# Patient Record
Sex: MALE | Race: WHITE | NOT HISPANIC OR LATINO | Employment: FULL TIME | URBAN - METROPOLITAN AREA
[De-identification: names, ages, dates, MRNs, and addresses within clinical notes are randomized per-mention and may not be internally consistent; named-entity substitution may affect disease eponyms.]

---

## 2023-04-14 LAB
PROSTATE SPECIFIC AG (NG/ML) IN SER/PLAS: 9 NG/ML (ref 0–4)
PROSTATE SPECIFIC AG FREE (NG/ML) IN SER/PLAS: 1.5 NG/ML
PROSTATE SPECIFIC AG FREE/PROSTATE SPECIFIC AG TOTAL IN SER/PLAS: 17 %

## 2024-03-27 ENCOUNTER — HOSPITAL ENCOUNTER (OUTPATIENT)
Dept: RADIATION ONCOLOGY | Facility: CLINIC | Age: 69
Setting detail: RADIATION/ONCOLOGY SERIES
Discharge: HOME | End: 2024-03-27
Payer: COMMERCIAL

## 2024-03-27 VITALS
RESPIRATION RATE: 18 BRPM | SYSTOLIC BLOOD PRESSURE: 155 MMHG | OXYGEN SATURATION: 96 % | TEMPERATURE: 97.5 F | HEART RATE: 68 BPM | HEIGHT: 70 IN | BODY MASS INDEX: 29.32 KG/M2 | WEIGHT: 204.81 LBS | DIASTOLIC BLOOD PRESSURE: 76 MMHG

## 2024-03-27 DIAGNOSIS — R33.9 INCOMPLETE BLADDER EMPTYING: Primary | ICD-10-CM

## 2024-03-27 DIAGNOSIS — C61 PROSTATE CANCER (MULTI): ICD-10-CM

## 2024-03-27 PROCEDURE — 99205 OFFICE O/P NEW HI 60 MIN: CPT | Performed by: RADIOLOGY

## 2024-03-27 PROCEDURE — 99215 OFFICE O/P EST HI 40 MIN: CPT | Performed by: RADIOLOGY

## 2024-03-27 RX ORDER — ASPIRIN 81 MG/1
TABLET ORAL
COMMUNITY

## 2024-03-27 RX ORDER — ATORVASTATIN CALCIUM 10 MG/1
10 TABLET, FILM COATED ORAL DAILY
COMMUNITY
Start: 2024-02-17

## 2024-03-27 RX ORDER — TADALAFIL 5 MG/1
5 TABLET ORAL DAILY
Qty: 30 TABLET | Refills: 11 | Status: SHIPPED | OUTPATIENT
Start: 2024-03-27 | End: 2025-03-22

## 2024-03-27 RX ORDER — TAMSULOSIN HYDROCHLORIDE 0.4 MG/1
0.4 CAPSULE ORAL DAILY
Qty: 30 CAPSULE | Refills: 11 | Status: SHIPPED | OUTPATIENT
Start: 2024-03-27 | End: 2025-03-22

## 2024-03-27 RX ORDER — LISINOPRIL 5 MG/1
5 TABLET ORAL
COMMUNITY
Start: 2014-06-30

## 2024-03-27 RX ORDER — CHLORHEXIDINE GLUCONATE 40 MG/ML
SOLUTION TOPICAL
COMMUNITY
Start: 2020-10-14

## 2024-03-27 RX ORDER — CHLORDIAZEPOXIDE HYDROCHLORIDE AND CLIDINIUM BROMIDE 5; 2.5 MG/1; MG/1
CAPSULE ORAL
COMMUNITY

## 2024-03-27 RX ORDER — BISOPROLOL FUMARATE 5 MG/1
TABLET, FILM COATED ORAL
COMMUNITY

## 2024-03-27 ASSESSMENT — PATIENT HEALTH QUESTIONNAIRE - PHQ9
SUM OF ALL RESPONSES TO PHQ9 QUESTIONS 1 AND 2: 0
2. FEELING DOWN, DEPRESSED OR HOPELESS: NOT AT ALL
1. LITTLE INTEREST OR PLEASURE IN DOING THINGS: NOT AT ALL

## 2024-03-27 ASSESSMENT — PAIN SCALES - GENERAL: PAINLEVEL: 0-NO PAIN

## 2024-03-27 ASSESSMENT — COLUMBIA-SUICIDE SEVERITY RATING SCALE - C-SSRS
1. IN THE PAST MONTH, HAVE YOU WISHED YOU WERE DEAD OR WISHED YOU COULD GO TO SLEEP AND NOT WAKE UP?: NO
2. HAVE YOU ACTUALLY HAD ANY THOUGHTS OF KILLING YOURSELF?: NO
6. HAVE YOU EVER DONE ANYTHING, STARTED TO DO ANYTHING, OR PREPARED TO DO ANYTHING TO END YOUR LIFE?: NO

## 2024-03-27 ASSESSMENT — ENCOUNTER SYMPTOMS
LOSS OF SENSATION IN FEET: 0
OCCASIONAL FEELINGS OF UNSTEADINESS: 0
DEPRESSION: 0

## 2024-03-27 NOTE — PROGRESS NOTES
Radiation Oncology Outpatient Consult    Patient Name:  Mauri Sanchez  MRN:  [id number]  :  1955    Referring Provider: Elliott Sanchez, *  Primary Care Provider: Oswaldo Allen MD  Care Team: Patient Care Team:  Oswaldo Allen MD as PCP - General    Date of Service: 3/27/2024       SUMMARY: 68 y.o. male with unfavorable-intermediate risk prostate cancer, [id number], Moffett score 3+4=7, 7/14 positive cores, pPSA 15, Grade group 2    SUBJECTIVE  History of Present Illness:  Mauri Sanchez is a 68 y.o. male who was referred by Elliott Sanchez, *, for a consultation to the [facility] Department of Radiation Oncology.  He is presenting for evaluation and management of prostate cancer    He was under active surveillance for low risk prostate cancer by urology ([facility] Dr Beckett). Pt reportedly underwent a biopsy  which demonstrated Moffett score 3+4=7, 7/14 positive cores  MRI of the prostate showed BPH, No focal lesions with imaging features suspicious for clinically significant prostate cancer (PI-RADS 1)  ZOFIA. PSMA PET/CT pending    Today, the patient reports feeling well overall. He denies changes in his symptoms.  His sexual Health Inventory for Men score (NAVARRO) is 15. His International Prostate Symptom Score (IPSS) score is 33. He is not on flomax or Cialis He denies diarrhea or constipation. He denies bone pain.    Prior Radiotherapy:  No  Radiation Treatments       No radiation treatments to show. (Treatments may have been administered in another system.)          Current Systemic Treatment:  No     Presence of Pacemaker or ICD:  No    Past Medical History:    Past Medical History:   Diagnosis Date    Basal cell carcinoma     GERD (gastroesophageal reflux disease)     Hypercholesteremia     Myocardial infarction (CMS/HCC)     Prostate cancer (CMS/HCC)     Squamous cell carcinoma of hand, left      History of autoimmune disease: No  History of Cancer: Yes, describe:  prostate CA initially diagnosis 2017 and Skin CA    Past Surgical History:    Past Surgical History:   Procedure Laterality Date    CARDIAC SURGERY      SKIN CANCER EXCISION          Family History:  Cancer-related family history includes Skin cancer in his father and mother.    Social History:    Social History     Tobacco Use    Smoking status: Every Day     Types: Cigarettes     Passive exposure: Never    Smokeless tobacco: Never   Vaping Use    Vaping Use: Never used   Substance Use Topics    Alcohol use: Yes     Alcohol/week: 5.0 standard drinks of alcohol     Types: 5 Glasses of wine per week     Comment: occassionally    Drug use: Never     Where does the patient live: [facility]  Can they come to [place] for treatment (Yes/No):  Yes  Can they go to [facility] for treatment if indicated (Yes/No): Yes  If no [place]/[facility], then where would want to be treated:-   Smoking history (Current/Former/Never): Current without an interest in quitting  Occupation: Manager    Social work Assessment:  Does the patient require social work referral (Yes/No): No    Allergies:    Allergies   Allergen Reactions    Codeine Other    Sulfa (Sulfonamide Antibiotics) Hives        Medications:    Current Outpatient Medications:     atorvastatin (Lipitor) 10 mg tablet, Take 1 tablet (10 mg) by mouth once daily., Disp: , Rfl:     chlorhexidine (Hibiclens) 4 % external liquid, 2 Application, Disp: , Rfl:     lisinopril 5 mg tablet, Take 1 tablet (5 mg) by mouth., Disp: , Rfl:     aspirin 81 mg EC tablet, Take by mouth., Disp: , Rfl:     bisoprolol (Zebeta) 5 mg tablet, TAKE ONE TABLET BY MOUTH EVERY DAY FOR 90 DAYS, Disp: , Rfl:     chlordiazepoxide-clidinium (Librax) 5-2.5 mg capsule, TAKE ONE CAPSULE BY MOUTH DAILY FOR 90 DAYS, Disp: , Rfl:       Review of Systems:  Review of Systems   Constitutional:  Positive for diaphoresis and fatigue. Negative for appetite change, chills, fever and unexpected weight change.   HENT:  Negative.     Eyes:  "Negative.    Respiratory: Negative.     Cardiovascular: Negative.    Gastrointestinal: Negative.    Endocrine: Negative.    Genitourinary:  Positive for bladder incontinence, difficulty urinating, frequency, nocturia and pelvic pain. Negative for dyspareunia, dysuria and hematuria.         IPSS score 33 and NAVARRO score 15   Musculoskeletal: Negative.    Skin:  Positive for wound. Negative for itching and rash.        Squamous cell skin removal from back this AM   Neurological: Negative.    Hematological:  Negative for adenopathy. Bruises/bleeds easily.   Psychiatric/Behavioral: Negative.       The patient's current pain level was assessed.  They report currently having a pain of 0 out of 10.  They feel their pain is under control without the use of pain medications.      Performance Status:  The Karnofsky performance scale today is 90, Able to carry on normal activity; minor signs or symptoms of disease (ECOG equivalent 0).            OBJECTIVE  Physical Exam:  /76 (BP Location: Right arm, Patient Position: Sitting, BP Cuff Size: Large adult)   Pulse 68   Temp 36.4 °C (97.5 °F) (Temporal)   Resp 18   Ht (S) 1.782 m (5' 10.16\")   Wt 92.9 kg (204 lb 12.9 oz)   SpO2 96%   BMI 29.26 kg/m²    Constitutional: Awake, alert and oriented. No acute distress. Appears stated age.  Eyes: Conjunctivae are clear without exudates or hemorrhage. Eyelids are normal in appearance without swelling or lesions.  ENMT: mucous membranes moist, no apparent injury, no lesions seen  Neck: Neck supple, no apparent injury, trachea midline  Resp/Thorax: Patent airways,  good chest expansion. Thorax symmetric  Cardiovascular: The external chest is normal without lifts, heaves, or thrills. PMI is not visible.  GI: Nondistended, soft, non-tender.  /Gyn: Deferred  MSK: No tenderness noted on palpation of the spine. No ROM limitations.  Extremities: normal extremities, no cyanosis, erythema or edema.  Neurological: alert and oriented " x3. Sensory and motor function appear intact. No gait abnormality observed.  Psych: Appropriate mood and behavior.  Skin: Warm and dry, no new lesions or rashes.      Laboratory Review:    PSA   Date Value Ref Range Status   04/11/2023 9.0 (H) 0.0 - 4.0 ng/mL Final     Comment:     INTERPRETIVE INFORMATION: Prostate Specific Antigen  The Roche PSA electrochemiluminescent immunoassay is used. Results   obtained with different test methods or kits cannot be used   interchangeably. The Roche PSA method is approved for use as an   aid in the detection of prostate cancer when used in conjunction   with a digital rectal exam in individuals with a prostate age 50   years and older. The Roche PSA is also indicated for the serial   measurement of PSA to aid in the prognosis and management of   prostate cancer patients. Elevated PSA concentrations can only   suggest the presence of prostate cancer until biopsy is performed.   PSA concentrations can also be elevated in benign prostatic   hyperplasia or inflammatory conditions of the prostate. PSA is   generally not elevated in healthy individuals or individuals with   nonprostatic carcinoma.       PSA, Free   Date Value Ref Range Status   04/11/2023 1.5 ng/mL Final     PSA, Free Pct   Date Value Ref Range Status   04/11/2023 17 % Final     Comment:     INTERPRETIVE INFORMATION: Prostate Specific Antigen, Free                            Percentage  [facility] uses the Roche Free PSA electrochemiluminescent immunoassay   method in conjunction with the Roche PSA electrochemiluminescent   immunoassay method to determine the free PSA percentage. Values   obtained with different assay methods should not be used   interchangeably. The free PSA percentage is an aid in   distinguishing prostate cancer from benign prostatic conditions in   individuals with a prostate age 50 years and older with a total   PSA between 3 and 10 ng/mL and negative digital rectal examination   findings. Prostatic  biopsy is required for the diagnosis of   cancer.   In patients with total PSA concentrations of 4-10 ng/mL, the   probability of finding prostate cancer on needle biopsy by age in   years is:  %fPSA               50-59    60-69    70 or older  0  - 10%            49%      58%      65%  11 - 18%            27%      34%      41%  19 - 25%            18%      24%      30%  Greater than 25%     9%      12%      16%  Other factors may help determine the actual risk of prostate   cancer in individual patients.  Performed By: [facility]  [address] [place] [id number]  : Duglas Nova MD, PhD         The pertinent lab results were reviewed and discussed with the patient.      Pathology Review:          Imaging:  The pertinent imaging results were reviewed and discussed with the patient.  See HPI       ASSESSMENT:  Mauri Sanchez is a 68 y.o. male with No matching staging information was found for the patient..      COUNSELING AND COORDINATION OF CARE:    The natural history of prostate cancer was reviewed with the patient. Prognostic factors including the following were discussed: clinical staging by JUJU, pretreatment PSA, GS/grade group on biopsy, and the number of biopsy cores involved with cancer, cancer volume, and the patient's performance status/comorbidities.    The patient's the clinical presentation, PSA lab findings and imaging findings were presented. Based on his risk factors, he would be classified as unfavorable intermediate risk.    We discussed with the patient treatment options including radical prostatectomy, radiation therapy including stereotactic body radiation therapy, standard and hypofractionated radiation therapy with or without short term androgen deprivation therapy using photons, protons and brachytherapy. Potential benefit, side effects and complications of each treatment was discussed.    The indications, benefits, side effects and complications of  radiotherapy, which include both acute and late side effects were highlighted. Acute: Fatigue, dysuria, frequency, urine retention, rectal urgency, diarrhea. Late: Stricture, cystitis, proctitis, sexual dysfunction, second malignancy have all been discussed in detail with the patient. All questions were answered to the patient´s satisfaction.    We discussed the importance of fiducials and SpaceOAR for SBRT and proton radiation therapy.  We also discussed the benefit of SpaceOAR for all patients undergoing radiation therapy to help spare rectal toxicity.  We briefly discussed the process of prostate seed implant.     The patient had all questions answered and concerns addressed during the visit. The patient appears to be leaning towards radiotherapy at this time and would like to initiate preperation for treatment.    The patient appears  to  be  eligible for G -010 for U[facility] PCA patients, where the patient would be assigned to a subgroup based on Decipher score, with randomization to de-escalation vs SOC for low decipher, or escalation of therapy vs SOC for high decipher        PLAN:    Will obtain repeat labs   Will work on obtaining outside records   Staging PSMA PET/CT given UIR  Will follow-up after PET/CT    NCCN Guidelines were applicable to guide this patients treatment plan.   Meredith Gardner MD       Future Appointments   Date Time Provider Department Center   4/1/2024  2:45 PM PAR [facility] MOBILE ADMIN ROOM PET CT PARPETCTMOB [facility] PET   4/1/2024  3:45 PM PAR [facility] MOBILE PET CT [facility]PET[facility]B [facility] PET   4/15/2024  1:30 PM Meredith Gardner MD [id number]

## 2024-03-27 NOTE — ADDENDUM NOTE
Encounter addended by: Estiven Castillo RN on: 3/27/2024 5:17 PM   Actions taken: Patient Education assessment filed

## 2024-03-27 NOTE — ADDENDUM NOTE
Encounter addended by: Meredith Gardner MD on: 3/27/2024 4:29 PM   Actions taken: Clinical Note Signed

## 2024-03-27 NOTE — ADDENDUM NOTE
Encounter addended by: Meredith Gardner MD on: 3/27/2024 4:30 PM   Actions taken: Clinical Note Signed

## 2024-03-28 ENCOUNTER — LAB (OUTPATIENT)
Dept: LAB | Facility: LAB | Age: 69
End: 2024-03-28
Payer: COMMERCIAL

## 2024-03-28 DIAGNOSIS — C61 PROSTATE CANCER (MULTI): ICD-10-CM

## 2024-03-28 LAB — TESTOST SERPL-MCNC: 661 NG/DL (ref 240–1000)

## 2024-03-28 PROCEDURE — 36415 COLL VENOUS BLD VENIPUNCTURE: CPT

## 2024-03-28 PROCEDURE — 84154 ASSAY OF PSA FREE: CPT

## 2024-03-28 PROCEDURE — 84403 ASSAY OF TOTAL TESTOSTERONE: CPT

## 2024-03-28 PROCEDURE — 84153 ASSAY OF PSA TOTAL: CPT

## 2024-03-29 ENCOUNTER — LAB REQUISITION (OUTPATIENT)
Dept: LAB | Facility: HOSPITAL | Age: 69
End: 2024-03-29
Payer: COMMERCIAL

## 2024-03-29 PROCEDURE — 88321 CONSLTJ&REPRT SLD PREP ELSWR: CPT | Performed by: PATHOLOGY

## 2024-03-30 LAB
PSA FREE MFR SERPL: 13 %
PSA FREE SERPL-MCNC: 2.1 NG/ML
PSA SERPL IA-MCNC: 15.8 NG/ML (ref 0–4)

## 2024-04-01 ENCOUNTER — HOSPITAL ENCOUNTER (OUTPATIENT)
Dept: RADIOLOGY | Facility: CLINIC | Age: 69
Discharge: HOME | End: 2024-04-01
Payer: COMMERCIAL

## 2024-04-01 DIAGNOSIS — C61 PROSTATE CANCER (MULTI): ICD-10-CM

## 2024-04-01 PROCEDURE — 78815 PET IMAGE W/CT SKULL-THIGH: CPT | Mod: PI

## 2024-04-01 PROCEDURE — 78815 PET IMAGE W/CT SKULL-THIGH: CPT | Mod: PET TUMOR INIT TX STRAT | Performed by: STUDENT IN AN ORGANIZED HEALTH CARE EDUCATION/TRAINING PROGRAM

## 2024-04-15 ENCOUNTER — HOSPITAL ENCOUNTER (OUTPATIENT)
Dept: RADIATION ONCOLOGY | Facility: CLINIC | Age: 69
Setting detail: RADIATION/ONCOLOGY SERIES
Discharge: HOME | End: 2024-04-15
Payer: COMMERCIAL

## 2024-04-15 DIAGNOSIS — C61 PROSTATE CANCER (MULTI): Primary | ICD-10-CM

## 2024-04-15 PROCEDURE — 99214 OFFICE O/P EST MOD 30 MIN: CPT | Performed by: RADIOLOGY

## 2024-04-15 NOTE — PROGRESS NOTES
Radiation Oncology Follow-Up    Patient Name:  Mauri Sanchez  MRN:  [id number]  :  1955    Referring Provider: Elliott Sanchez, *  Primary Care Provider: Oswaldo Allen MD  Care Team: Patient Care Team:  Oswaldo Allen MD as PCP - General    Date of Service: 4/15/2024     SUMMARY: 68 y.o. male with unfavorable-intermediate risk prostate cancer, [id number], Raz score 3+4=7, 7/14 positive cores, pPSA 15.8, Grade group 2     SUBJECTIVE  History of Present Illness:   Mauri Sanchez is a 68 y.o. male who is presenting today for follow-up.       INTERIM HISTORY:    PSMA PET/CT without evidence of distant mets    Most recent PSA     Lab Results   Component Value Date    PSA 15.8 (H) 2024       SUBJECTIVE:      Today, the patient reports feeling well overall. He denies changes in his symptoms.  His sexual Health Inventory for Men score (NAVARRO) is 15. His International Prostate Symptom Score (IPSS) score is 33. He is not on flomax or Cialis He denies diarrhea or constipation. He denies bone pain.     Review of Systems:    A 14-point review of systems is negative except for what's listed in the history of present illness    Performance Status:  ECOG 1        OBJECTIVE  Vital Signs:  There were no vitals taken for this visit.   Physical Exam:  Constitutional: Awake, alert and oriented. No acute distress. Appears stated age.  Neurological: alert and oriented x3  Psych: Appropriate mood and behavior.        Lab Results   Component Value Date    PSA 15.8 (H) 2024    PSA 9.0 (H) 2023    TESTOSTERONE 661 2024            ASSESSMENT:  Mauri Sanchez is a 68 y.o. male with Prostate cancer (Multi), Clinical: Stage IIB (cT1c, cN0, cM0, PSA: 15.8, Grade Group: 2).      COUNSELING AND COORDINATION OF CARE:    The natural history of prostate cancer was reviewed with the patient. Prognostic factors including the following were discussed: clinical staging by JUJU, pretreatment PSA, GS/grade group on  biopsy, and the number of biopsy cores involved with cancer, cancer volume, and the patient's performance status/comorbidities.    The patient's the clinical presentation, PSA lab findings and imaging findings were presented. Based on his risk factors, he would be classified as unfavorable intermediate risk.    We discussed with the patient treatment options including radical prostatectomy, radiation therapy including stereotactic body radiation therapy, standard and hypofractionated radiation therapy with or without short term androgen deprivation therapy using photons, protons and brachytherapy. Potential benefit, side effects and complications of each treatment was discussed.    The indications, benefits, side effects and complications of radiotherapy, which include both acute and late side effects were highlighted. Acute: Fatigue, dysuria, frequency, urine retention, rectal urgency, diarrhea. Late: Stricture, cystitis, proctitis, sexual dysfunction, second malignancy have all been discussed in detail with the patient. All questions were answered to the patient´s satisfaction.    We discussed the importance of fiducials and SpaceOAR for SBRT and proton radiation therapy.  We also discussed the benefit of SpaceOAR for all patients undergoing radiation therapy to help spare rectal toxicity.  We briefly discussed the process of prostate seed implant.     The patient had all questions answered and concerns addressed during the visit. The patient appears to be leaning towards radiotherapy at this time and would like to initiate preperation for treatment.        PLAN:    The patient would like to proceed with SBRT  Will refer the patient to Shade Corrales for ADT injection and spaceOAR  Pt would like ADT injection on his next FU visit, but spaceOAR after the 4th of the July  MRI and CT sim after SpaceOAR insertion      NCCN Guidelines were applicable to guide this patients treatment plan.  Meredith Gardner MD       No future  appointments.

## 2024-05-13 ENCOUNTER — TELEPHONE (OUTPATIENT)
Dept: RADIATION ONCOLOGY | Facility: CLINIC | Age: 69
End: 2024-05-13
Payer: COMMERCIAL

## 2024-05-22 LAB
LAB AP ASR DISCLAIMER: NORMAL
LABORATORY COMMENT REPORT: NORMAL
PATH REPORT.FINAL DX SPEC: NORMAL
PATH REPORT.GROSS SPEC: NORMAL
PATH REPORT.TOTAL CANCER: NORMAL

## 2024-05-23 ENCOUNTER — OFFICE VISIT (OUTPATIENT)
Dept: UROLOGY | Facility: CLINIC | Age: 69
End: 2024-05-23
Payer: COMMERCIAL

## 2024-05-23 VITALS
HEIGHT: 72 IN | DIASTOLIC BLOOD PRESSURE: 77 MMHG | WEIGHT: 206.13 LBS | BODY MASS INDEX: 27.92 KG/M2 | HEART RATE: 73 BPM | SYSTOLIC BLOOD PRESSURE: 134 MMHG | RESPIRATION RATE: 16 BRPM

## 2024-05-23 DIAGNOSIS — C61 PROSTATE CANCER (MULTI): Primary | ICD-10-CM

## 2024-05-23 DIAGNOSIS — N40.1 BPH WITH OBSTRUCTION/LOWER URINARY TRACT SYMPTOMS: ICD-10-CM

## 2024-05-23 DIAGNOSIS — N13.8 BPH WITH OBSTRUCTION/LOWER URINARY TRACT SYMPTOMS: ICD-10-CM

## 2024-05-23 PROCEDURE — 1159F MED LIST DOCD IN RCRD: CPT | Performed by: UROLOGY

## 2024-05-23 PROCEDURE — 99204 OFFICE O/P NEW MOD 45 MIN: CPT | Performed by: UROLOGY

## 2024-05-23 PROCEDURE — 1160F RVW MEDS BY RX/DR IN RCRD: CPT | Performed by: UROLOGY

## 2024-05-23 PROCEDURE — 1126F AMNT PAIN NOTED NONE PRSNT: CPT | Performed by: UROLOGY

## 2024-05-23 PROCEDURE — 99214 OFFICE O/P EST MOD 30 MIN: CPT | Performed by: UROLOGY

## 2024-05-23 PROCEDURE — G2211 COMPLEX E/M VISIT ADD ON: HCPCS | Performed by: UROLOGY

## 2024-05-23 PROCEDURE — 4004F PT TOBACCO SCREEN RCVD TLK: CPT | Performed by: UROLOGY

## 2024-05-23 ASSESSMENT — PAIN SCALES - GENERAL: PAINLEVEL: 0-NO PAIN

## 2024-05-23 NOTE — PROGRESS NOTES
History Of Present Illness  68-year-old male with prostate cancer referred to me for evaluation for ADT  PMH: Hyperlipidemia, prior MI, lower urinary tract symptoms, hypertension    Lower urinary tract symptoms on Flomax, tadalafil 5 mg daily.  Patient recently started, has improving symptoms.    Prostate cancer history:  - Intermediate risk unfavorable (50% cores, PSA 15.8, grade group 2)  - cT1c, grade group 2, 7/14 cores, PSA 15.8  Deana 15  IPSS 33  - 4/1/24 PSMA PET ROSANGELA outside of prostate    Past Medical History  He has a past medical history of Basal cell carcinoma, GERD (gastroesophageal reflux disease), Hypercholesteremia, Myocardial infarction (Multi), Prostate cancer (Multi), and Squamous cell carcinoma of hand, left.    Surgical History  He has a past surgical history that includes Skin cancer excision and Cardiac surgery.     Social History  He reports that he has been smoking cigarettes. He has never been exposed to tobacco smoke. He has never used smokeless tobacco. He reports current alcohol use of about 5.0 standard drinks of alcohol per week. He reports that he does not use drugs.    Family History  Family History   Problem Relation Name Age of Onset    Skin cancer Mother      Skin cancer Father          Allergies  Codeine and Sulfa (sulfonamide antibiotics)    ROS: 12 system review was completed and is negative with the exception of those signs and symptoms noted in the history of present illness: A 12 system review was completed and is negative with the exception of those signs and symptoms noted in the history of present illness.     Exam:  General: in NAD, appears stated age  Head: normocephalic, atraumatic  Respiratory: normal effort, no use of accessory muscles  Cardiovascular: no edema noted  Skin: normal turgor, no rashes  Neurologic: grossly intact, oriented to person/place/time  Psychiatric: mode and affect appropriate     Last Recorded Vitals  There were no vitals taken for this  "visit.    No results found for: \"PSASCREEN\", \"PSAR\", \"KSCOR\", \"CREATININE\", \"HGB\"      ASSESSMENT/PLAN:  # Prostate cancer, intermediate risk unfavorable patient has decided on radiation therapy  -We spent most of our visit today discussing hormone therapy, side effects including weakness, hot flashes, decrease in libido, breast growth  -We discussed that it typically takes 6 to 8 months before testosterone improves  -Plan for ADT roughly in July due to his travel needs  -We also discussed indications for ADT, specifically the improved progression free survival with short-term ADT combined with EBRT  -We also discussed SpaceOAR and fiducials.  I described the procedure to him in detail including perioperative risks, urinary retention, hematuria, injection into the rectum  -Patient will follow-up 6 months after ADT to check PSA, testosterone    # Lower urinary tract symptoms  -Continue tamsulosin, tadalafil    Shade Corrales MD    "

## 2024-06-20 DIAGNOSIS — C61 PROSTATE CANCER (MULTI): ICD-10-CM

## 2024-07-01 ENCOUNTER — APPOINTMENT (OUTPATIENT)
Dept: UROLOGY | Facility: CLINIC | Age: 69
End: 2024-07-01
Payer: COMMERCIAL

## 2024-07-08 ENCOUNTER — TELEPHONE (OUTPATIENT)
Dept: RADIATION ONCOLOGY | Facility: CLINIC | Age: 69
End: 2024-07-08
Payer: COMMERCIAL

## 2024-07-08 NOTE — TELEPHONE ENCOUNTER
2024    Called  Mauri  to discuss education for upcoming CT Simulation that is scheduled for  7/15/2024 @12:00 PM.  Verified patient name/.     Reviewed the following:     Bowel Preparation   The goal is to have 1 complete bowel movement each day.  If you do not have 1 complete bowel movement each day, try and get into this routine at least 2 days before your CT Simulation appointment.  Continue this routine throughout your treatment.     Tips and Tricks to have 1 complete bowel movement a day:  - Over-the-counter stool softeners such as Colace  - Over-the-counter laxatives such as Miralax or Senna  - Prunes or prune juice    Start taking Gas-X OR Beano TID three days prior to appt.  Eliminate fiber rich foods starting 3 days prior to appt.     Bladder Preparation Goal  Finish drinking 20 ounces of water 45 minutes before your CT Simulation appointment and each radiation therapy treatment.  Your bladder should be comfortably full at time of CT Simulation and for each radiation therapy treatment.        Mauri  verbalized understanding of all instructions.  Denied further questions at this time.     Department phone number given: [phone].  Encouraged to call and ask to speak with a radiation RN if any questions present.      LORENZO MERIDA, RN

## 2024-07-09 ENCOUNTER — ANESTHESIA EVENT (OUTPATIENT)
Dept: OPERATING ROOM | Facility: CLINIC | Age: 69
End: 2024-07-09
Payer: MEDICARE

## 2024-07-09 ENCOUNTER — ANESTHESIA (OUTPATIENT)
Dept: OPERATING ROOM | Facility: CLINIC | Age: 69
End: 2024-07-09
Payer: MEDICARE

## 2024-07-09 ENCOUNTER — HOSPITAL ENCOUNTER (OUTPATIENT)
Facility: CLINIC | Age: 69
Setting detail: OUTPATIENT SURGERY
Discharge: HOME | End: 2024-07-09
Attending: UROLOGY | Admitting: UROLOGY
Payer: MEDICARE

## 2024-07-09 VITALS
OXYGEN SATURATION: 98 % | WEIGHT: 204.15 LBS | HEIGHT: 72 IN | RESPIRATION RATE: 18 BRPM | DIASTOLIC BLOOD PRESSURE: 65 MMHG | HEART RATE: 51 BPM | BODY MASS INDEX: 27.65 KG/M2 | SYSTOLIC BLOOD PRESSURE: 144 MMHG | TEMPERATURE: 96.8 F

## 2024-07-09 DIAGNOSIS — C61 PROSTATE CANCER (MULTI): Primary | ICD-10-CM

## 2024-07-09 PROCEDURE — 76872 US TRANSRECTAL: CPT | Performed by: UROLOGY

## 2024-07-09 PROCEDURE — 7100000001 HC RECOVERY ROOM TIME - INITIAL BASE CHARGE: Performed by: UROLOGY

## 2024-07-09 PROCEDURE — C1889 IMPLANT/INSERT DEVICE, NOC: HCPCS | Performed by: UROLOGY

## 2024-07-09 PROCEDURE — 2500000004 HC RX 250 GENERAL PHARMACY W/ HCPCS (ALT 636 FOR OP/ED)

## 2024-07-09 PROCEDURE — 7100000002 HC RECOVERY ROOM TIME - EACH INCREMENTAL 1 MINUTE: Performed by: UROLOGY

## 2024-07-09 PROCEDURE — 7100000009 HC PHASE TWO TIME - INITIAL BASE CHARGE: Performed by: UROLOGY

## 2024-07-09 PROCEDURE — 2500000005 HC RX 250 GENERAL PHARMACY W/O HCPCS: Performed by: ANESTHESIOLOGY

## 2024-07-09 PROCEDURE — 2500000005 HC RX 250 GENERAL PHARMACY W/O HCPCS

## 2024-07-09 PROCEDURE — 55876 PLACE RT DEVICE/MARKER PROS: CPT | Performed by: UROLOGY

## 2024-07-09 PROCEDURE — A55874 PR TRANSPERINEAL PLACEMENT OF BIODEGRADABLE MATERIAL, PERI-PROSTATIC, SINGLE OR MULTIPLE

## 2024-07-09 PROCEDURE — 3600000004 HC OR TIME - INITIAL BASE CHARGE - PROCEDURE LEVEL FOUR: Performed by: UROLOGY

## 2024-07-09 PROCEDURE — A55874 PR TRANSPERINEAL PLACEMENT OF BIODEGRADABLE MATERIAL, PERI-PROSTATIC, SINGLE OR MULTIPLE: Performed by: ANESTHESIOLOGY

## 2024-07-09 PROCEDURE — 3600000009 HC OR TIME - EACH INCREMENTAL 1 MINUTE - PROCEDURE LEVEL FOUR: Performed by: UROLOGY

## 2024-07-09 PROCEDURE — 2780000003 HC OR 278 NO HCPCS: Performed by: UROLOGY

## 2024-07-09 PROCEDURE — 3700000001 HC GENERAL ANESTHESIA TIME - INITIAL BASE CHARGE: Performed by: UROLOGY

## 2024-07-09 PROCEDURE — 7100000010 HC PHASE TWO TIME - EACH INCREMENTAL 1 MINUTE: Performed by: UROLOGY

## 2024-07-09 PROCEDURE — 55874 TPRNL PLMT BIODEGRDABL MATRL: CPT | Performed by: UROLOGY

## 2024-07-09 PROCEDURE — 3700000002 HC GENERAL ANESTHESIA TIME - EACH INCREMENTAL 1 MINUTE: Performed by: UROLOGY

## 2024-07-09 RX ORDER — SODIUM CHLORIDE, SODIUM LACTATE, POTASSIUM CHLORIDE, CALCIUM CHLORIDE 600; 310; 30; 20 MG/100ML; MG/100ML; MG/100ML; MG/100ML
125 INJECTION, SOLUTION INTRAVENOUS CONTINUOUS
Status: DISCONTINUED | OUTPATIENT
Start: 2024-07-09 | End: 2024-07-09 | Stop reason: HOSPADM

## 2024-07-09 RX ORDER — SODIUM CHLORIDE, SODIUM LACTATE, POTASSIUM CHLORIDE, CALCIUM CHLORIDE 600; 310; 30; 20 MG/100ML; MG/100ML; MG/100ML; MG/100ML
INJECTION, SOLUTION INTRAVENOUS CONTINUOUS PRN
Status: DISCONTINUED | OUTPATIENT
Start: 2024-07-09 | End: 2024-07-09

## 2024-07-09 RX ORDER — PROPOFOL 10 MG/ML
INJECTION, EMULSION INTRAVENOUS CONTINUOUS PRN
Status: DISCONTINUED | OUTPATIENT
Start: 2024-07-09 | End: 2024-07-09

## 2024-07-09 RX ORDER — OMEPRAZOLE 20 MG/1
20 TABLET, DELAYED RELEASE ORAL
COMMUNITY

## 2024-07-09 RX ORDER — MIDAZOLAM HYDROCHLORIDE 1 MG/ML
INJECTION, SOLUTION INTRAMUSCULAR; INTRAVENOUS AS NEEDED
Status: DISCONTINUED | OUTPATIENT
Start: 2024-07-09 | End: 2024-07-09

## 2024-07-09 RX ORDER — CEFAZOLIN 1 G/1
INJECTION, POWDER, FOR SOLUTION INTRAVENOUS AS NEEDED
Status: DISCONTINUED | OUTPATIENT
Start: 2024-07-09 | End: 2024-07-09

## 2024-07-09 RX ORDER — FENTANYL CITRATE 50 UG/ML
INJECTION, SOLUTION INTRAMUSCULAR; INTRAVENOUS AS NEEDED
Status: DISCONTINUED | OUTPATIENT
Start: 2024-07-09 | End: 2024-07-09

## 2024-07-09 RX ORDER — LIDOCAINE IN NACL,ISO-OSMOT/PF 30 MG/3 ML
0.1 SYRINGE (ML) INJECTION ONCE
Status: DISCONTINUED | OUTPATIENT
Start: 2024-07-09 | End: 2024-07-09 | Stop reason: HOSPADM

## 2024-07-09 RX ORDER — ONDANSETRON HYDROCHLORIDE 2 MG/ML
4 INJECTION, SOLUTION INTRAVENOUS ONCE AS NEEDED
Status: DISCONTINUED | OUTPATIENT
Start: 2024-07-09 | End: 2024-07-09 | Stop reason: HOSPADM

## 2024-07-09 RX ORDER — ACETAMINOPHEN 325 MG/1
TABLET ORAL AS NEEDED
Status: DISCONTINUED | OUTPATIENT
Start: 2024-07-09 | End: 2024-07-09

## 2024-07-09 RX ORDER — ALBUTEROL SULFATE 0.83 MG/ML
2.5 SOLUTION RESPIRATORY (INHALATION) ONCE AS NEEDED
Status: DISCONTINUED | OUTPATIENT
Start: 2024-07-09 | End: 2024-07-09 | Stop reason: HOSPADM

## 2024-07-09 RX ORDER — OXYCODONE HYDROCHLORIDE 5 MG/1
5 TABLET ORAL EVERY 4 HOURS PRN
Status: DISCONTINUED | OUTPATIENT
Start: 2024-07-09 | End: 2024-07-09 | Stop reason: HOSPADM

## 2024-07-09 RX ORDER — LIDOCAINE HYDROCHLORIDE 20 MG/ML
INJECTION, SOLUTION INFILTRATION; PERINEURAL AS NEEDED
Status: DISCONTINUED | OUTPATIENT
Start: 2024-07-09 | End: 2024-07-09

## 2024-07-09 RX ORDER — SODIUM CHLORIDE, SODIUM LACTATE, POTASSIUM CHLORIDE, CALCIUM CHLORIDE 600; 310; 30; 20 MG/100ML; MG/100ML; MG/100ML; MG/100ML
100 INJECTION, SOLUTION INTRAVENOUS CONTINUOUS
Status: DISCONTINUED | OUTPATIENT
Start: 2024-07-09 | End: 2024-07-09 | Stop reason: HOSPADM

## 2024-07-09 SDOH — HEALTH STABILITY: MENTAL HEALTH: CURRENT SMOKER: 1

## 2024-07-09 ASSESSMENT — PAIN SCALES - GENERAL
PAINLEVEL_OUTOF10: 0 - NO PAIN

## 2024-07-09 ASSESSMENT — PAIN - FUNCTIONAL ASSESSMENT
PAIN_FUNCTIONAL_ASSESSMENT: 0-10

## 2024-07-09 ASSESSMENT — COLUMBIA-SUICIDE SEVERITY RATING SCALE - C-SSRS
2. HAVE YOU ACTUALLY HAD ANY THOUGHTS OF KILLING YOURSELF?: NO
1. IN THE PAST MONTH, HAVE YOU WISHED YOU WERE DEAD OR WISHED YOU COULD GO TO SLEEP AND NOT WAKE UP?: NO
6. HAVE YOU EVER DONE ANYTHING, STARTED TO DO ANYTHING, OR PREPARED TO DO ANYTHING TO END YOUR LIFE?: NO

## 2024-07-09 NOTE — ANESTHESIA PREPROCEDURE EVALUATION
Patient: Mauri Sanchez    Procedure Information       Date/Time: 07/09/24 1015    Procedure: SpaceOAR and Prostate Fiducials    [facility] / [facility]    Surgeons: Shade Corrales MD            Relevant Problems   /Renal   (+) BPH with obstruction/lower urinary tract symptoms   (+) Prostate cancer (Multi)       Clinical information reviewed: still with sore throat but improving from several week respiratory issue negative COVID test   Tobacco  Allergies  Meds  Problems  Med Hx  Surg Hx   Fam Hx  Soc   Hx        NPO Detail:  NPO/Void Status  Date of Last Liquid: 07/09/24  Time of Last Liquid: 0800 (sip of water with morning meds)  Date of Last Solid: 07/08/24  Time of Last Solid: 1800         Physical Exam    Airway  Mallampati: III  TM distance: >3 FB     Cardiovascular - normal exam  Rhythm: regular  Rate: normal     Dental - normal exam     Pulmonary - normal exam     Abdominal        Anesthesia Plan    History of general anesthesia?: yes  History of complications of general anesthesia?: no    ASA 2     MAC     The patient is a current smoker.  Patient was previously instructed to abstain from smoking on day of procedure.  Patient did not smoke on day of procedure.    Anesthetic plan and risks discussed with patient.    Plan discussed with CAA.

## 2024-07-09 NOTE — DISCHARGE INSTRUCTIONS
Post-Procedure Instructions for SpaceOAR and Fiducial Marker Placement    Procedure Overview  You have had transperineal SpaceOAR and Fiducial Marker placement.     What to Expect:  Blood-tinged urine with/without clots for 24-72 hours.   Blood in the ejaculate for 4-6 weeks.    When to Call  Call your doctor immediately if you experience any of the following:   You are unable to urinate in 6-8 hours after the procedure.   Fever above 101 degrees (F) or Chills  Passing excessive clots in urine.    Pain control  Tylenol should be adequate.  May have Tylenol after: 4:30             General Instructions:  Resume normal diet.   Drink 6-8 glasses of water the rest of the day to dilute the urine and to prevent clot formation.   No alcoholic beverages for 24 hours.   No straining or heavy lifting for 5 days.    DR. TATE'S CONTACT INFORMATION  For non-urgent issues, please send our office a message via [employer], this is often easier and more convenient for you than calling the office. You should have received an invitation to sign-up for [employer] in your email upon registration.    For appointments:  [phone], option 1 -> option 3 -> option 9    For questions/issues/concerns during business hours: [phone] - this number will direct you to the voicemail for Dr. Tate's , which is frequently checked during business hours.     For after-hours issues:  [phone], this will direct you to our answering service or the resident physician on call if needed.

## 2024-07-09 NOTE — H&P
"History Of Present Illness  68-year-old male with prostate cancer here for [employer] and fiducials.  No recent changes in his health    Past Medical History  Past Medical History:   Diagnosis Date    Basal cell carcinoma     GERD (gastroesophageal reflux disease)     Hypercholesteremia     Hypertension     Myocardial infarction (Multi)     Prostate cancer (Multi)     Squamous cell carcinoma of hand, left         Surgical History  Past Surgical History:   Procedure Laterality Date    CARDIAC SURGERY      HERNIA REPAIR      SKIN CANCER EXCISION          Social History  He reports that he has been smoking cigarettes. He has never been exposed to tobacco smoke. He has never used smokeless tobacco. He reports current alcohol use of about 5.0 standard drinks of alcohol per week. He reports that he does not use drugs.    Family History  Family History   Problem Relation Name Age of Onset    Skin cancer Mother      Skin cancer Father          Allergies  Codeine and Sulfa (sulfonamide antibiotics)    ROS: 12 system review was completed and is negative with the exception of those signs and symptoms noted in the history of present illness: A 12 system review was completed and is negative with the exception of those signs and symptoms noted in the history of present illness.     Exam:  General: in NAD, appears stated age  Head: normocephalic, atraumatic  Respiratory: normal effort, no use of accessory muscles  Cardiovascular: no edema noted  Skin: normal turgor, no rashes  Neurologic: grossly intact, oriented to person/place/time  Psychiatric: mode and affect appropriate    Per anesthesiology, clear to auscultation bilaterally with a regular rate and rhythm     Last Recorded Vitals  /67   Pulse 56   Temp 36 °C (96.8 °F) (Temporal)   Resp 16   Ht 1.829 m (6')   Wt 92.6 kg (204 lb 2.3 oz)   SpO2 97%   BMI 27.69 kg/m²       No results found for: \"URINECULTURE\", \"CREATININE\"      ASSESSMENT/PLAN:  Okay to proceed with " prostate [employer] and fiducials    Shade Corrales MD

## 2024-07-09 NOTE — ANESTHESIA POSTPROCEDURE EVALUATION
Patient: Mauri Sanchez    Procedure Summary       Date: 07/09/24 [place] [facility] [place] [facility]    Anesthesia Start: 1036 Anesthesia Stop: 1107    Procedure: Space[facility] and Prostate Fiducials Diagnosis:       Prostate cancer (Multi)      (Prostate cancer (Multi) [C61])    Surgeons: Shade Corrales MD Responsible Provider: Levon Larsen MD    Anesthesia Type: MAC ASA Status: 2            Anesthesia Type: MAC    Vitals Value Taken Time   BP 93/52 07/09/24 1116   Temp 36 °C (96.8 °F) 07/09/24 1108   Pulse 49 07/09/24 1116   Resp 16 07/09/24 1116   SpO2 98 % 07/09/24 1116       Anesthesia Post Evaluation    Patient location during evaluation: bedside  Patient participation: complete - patient participated  Level of consciousness: awake  Pain management: adequate  Multimodal analgesia pain management approach  Airway patency: patent  Cardiovascular status: stable  Respiratory status: acceptable  Hydration status: acceptable  Postoperative Nausea and Vomiting: none  Comments: Stable in PACU    No notable events documented.

## 2024-07-12 ENCOUNTER — OFFICE VISIT (OUTPATIENT)
Dept: UROLOGY | Facility: CLINIC | Age: 69
End: 2024-07-12
Payer: COMMERCIAL

## 2024-07-12 VITALS
BODY MASS INDEX: 27.63 KG/M2 | DIASTOLIC BLOOD PRESSURE: 70 MMHG | HEART RATE: 63 BPM | HEIGHT: 72 IN | SYSTOLIC BLOOD PRESSURE: 112 MMHG | WEIGHT: 204 LBS

## 2024-07-12 DIAGNOSIS — C61 PROSTATE CANCER (MULTI): ICD-10-CM

## 2024-07-12 PROCEDURE — 96372 THER/PROPH/DIAG INJ SC/IM: CPT | Performed by: UROLOGY

## 2024-07-12 NOTE — PROGRESS NOTES
PRIOR NOTES  68-year-old male with prostate cancer referred to me for evaluation for ADT  PMH: Hyperlipidemia, prior MI, lower urinary tract symptoms, hypertension     Lower urinary tract symptoms on Flomax, tadalafil 5 mg daily.  Patient recently started, has improving symptoms.     Prostate cancer history:  - Intermediate risk unfavorable (50% cores, PSA 15.8, grade group 2)  - cT1c, grade group 2, 7/14 cores, PSA 15.8  Deana 15  IPSS 33  - 4/1/24 PSMA PET ROSANGELA outside of prostate  -7/9/2024-Space[facility] and fiducials  7/12/2024-leuprolide acetate 45 mg (6 months) first and only dose    UPDATED SUBJECTIVE HISTORY  07/12/24 -tolerated SpaceOAR and fiducials well, here for Lupron    Past Medical History  He has a past medical history of Basal cell carcinoma, GERD (gastroesophageal reflux disease), Hypercholesteremia, Hypertension, Myocardial infarction (Multi), Prostate cancer (Multi), and Squamous cell carcinoma of hand, left.    Surgical History  He has a past surgical history that includes Skin cancer excision; Cardiac surgery; and Hernia repair.     Social History  He reports that he has been smoking cigarettes. He has never been exposed to tobacco smoke. He has never used smokeless tobacco. He reports current alcohol use of about 5.0 standard drinks of alcohol per week. He reports that he does not use drugs.    Family History  Family History   Problem Relation Name Age of Onset    Skin cancer Mother      Skin cancer Father          Allergies  Codeine and Sulfa (sulfonamide antibiotics)    ROS: 12 system review was completed and is negative with the exception of those signs and symptoms noted in the history of present illness: A 12 system review was completed and is negative with the exception of those signs and symptoms noted in the history of present illness.     Exam:  General: in NAD, appears stated age  Head: normocephalic, atraumatic  Respiratory: normal effort, no use of accessory muscles  Cardiovascular: no  "edema noted  Skin: normal turgor, no rashes  Neurologic: grossly intact, oriented to person/place/time  Psychiatric: mode and affect appropriate    Patient ID: Mauri Sanchez is a 68 y.o. male.    Leuprolide acetate injection for prostate cancer    Date/Time: 7/12/2024 2:20 PM    Performed by: Shade Corrales MD  Authorized by: Shade Corrales MD  Comments: Patient's right upper outer butt cheek was prepped using an alcohol swab.  A leuprolide acetate 45 mg single Depo shot was instilled without complication.  Patient tolerated the procedure well.           Last Recorded Vitals  Blood pressure 112/70, pulse 63, height 1.829 m (6'), weight 92.5 kg (204 lb).    No results found for: \"PSASCREEN\", \"PSAR\", \"KSCOR\", \"CREATININE\", \"HGB\"      ASSESSMENT/PLAN:  # Prostate cancer  -Neck step is for MRI/CT and treatment planning  -Follow-up 6 months with testosterone and PSA    Shade Corrales MD    "

## 2024-07-15 ENCOUNTER — HOSPITAL ENCOUNTER (OUTPATIENT)
Dept: RADIOLOGY | Facility: CLINIC | Age: 69
Discharge: HOME | End: 2024-07-15
Payer: COMMERCIAL

## 2024-07-15 ENCOUNTER — HOSPITAL ENCOUNTER (OUTPATIENT)
Dept: RADIOLOGY | Facility: EXTERNAL LOCATION | Age: 69
Discharge: HOME | End: 2024-07-15

## 2024-07-15 ENCOUNTER — HOSPITAL ENCOUNTER (OUTPATIENT)
Dept: RADIATION ONCOLOGY | Facility: CLINIC | Age: 69
Setting detail: RADIATION/ONCOLOGY SERIES
Discharge: HOME | End: 2024-07-15
Payer: COMMERCIAL

## 2024-07-15 DIAGNOSIS — C61 PROSTATE CANCER (MULTI): ICD-10-CM

## 2024-07-15 DIAGNOSIS — C61 MALIGNANT NEOPLASM OF PROSTATE (MULTI): ICD-10-CM

## 2024-07-15 PROCEDURE — 77370 RADIATION PHYSICS CONSULT: CPT | Performed by: RADIOLOGY

## 2024-07-16 ENCOUNTER — HOSPITAL ENCOUNTER (OUTPATIENT)
Dept: RADIATION ONCOLOGY | Facility: HOSPITAL | Age: 69
Setting detail: RADIATION/ONCOLOGY SERIES
Discharge: HOME | End: 2024-07-16
Payer: COMMERCIAL

## 2024-07-16 ENCOUNTER — HOSPITAL ENCOUNTER (OUTPATIENT)
Dept: RADIATION ONCOLOGY | Facility: CLINIC | Age: 69
Setting detail: RADIATION/ONCOLOGY SERIES
Discharge: HOME | End: 2024-07-16
Payer: COMMERCIAL

## 2024-07-16 PROCEDURE — 77300 RADIATION THERAPY DOSE PLAN: CPT | Performed by: RADIOLOGY

## 2024-07-16 PROCEDURE — 77295 3-D RADIOTHERAPY PLAN: CPT | Performed by: RADIOLOGY

## 2024-07-16 PROCEDURE — 77334 RADIATION TREATMENT AID(S): CPT | Performed by: RADIOLOGY

## 2024-07-26 ENCOUNTER — APPOINTMENT (OUTPATIENT)
Dept: RADIATION ONCOLOGY | Facility: CLINIC | Age: 69
End: 2024-07-26
Payer: COMMERCIAL

## 2024-07-29 ENCOUNTER — HOSPITAL ENCOUNTER (OUTPATIENT)
Dept: RADIATION ONCOLOGY | Facility: CLINIC | Age: 69
Setting detail: RADIATION/ONCOLOGY SERIES
Discharge: HOME | End: 2024-07-29
Payer: COMMERCIAL

## 2024-07-29 DIAGNOSIS — C61 MALIGNANT NEOPLASM OF PROSTATE (MULTI): ICD-10-CM

## 2024-07-29 LAB
RAD ONC MSQ ACTUAL FRACTIONS DELIVERED: 1
RAD ONC MSQ ACTUAL SESSION DELIVERED DOSE: 750 CGRAY
RAD ONC MSQ ACTUAL TOTAL DOSE: 750 CGRAY
RAD ONC MSQ ELAPSED DAYS: 0
RAD ONC MSQ LAST DATE: NORMAL
RAD ONC MSQ PRESCRIBED FRACTIONAL DOSE: 750 CGRAY
RAD ONC MSQ PRESCRIBED NUMBER OF FRACTIONS: 5
RAD ONC MSQ PRESCRIBED TECHNIQUE: NORMAL
RAD ONC MSQ PRESCRIBED TOTAL DOSE: 3750 CGRAY
RAD ONC MSQ START DATE: NORMAL
RAD ONC MSQ TREATMENT COURSE NUMBER: 1
RAD ONC MSQ TREATMENT SITE: NORMAL

## 2024-07-29 PROCEDURE — 77280 THER RAD SIMULAJ FIELD SMPL: CPT | Performed by: RADIOLOGY

## 2024-07-29 PROCEDURE — 77373 STRTCTC BDY RAD THER TX DLVR: CPT | Performed by: RADIOLOGY

## 2024-07-31 ENCOUNTER — HOSPITAL ENCOUNTER (OUTPATIENT)
Dept: RADIATION ONCOLOGY | Facility: CLINIC | Age: 69
Setting detail: RADIATION/ONCOLOGY SERIES
Discharge: HOME | End: 2024-07-31
Payer: COMMERCIAL

## 2024-07-31 DIAGNOSIS — C61 MALIGNANT NEOPLASM OF PROSTATE (MULTI): ICD-10-CM

## 2024-07-31 DIAGNOSIS — Z51.0 ENCOUNTER FOR ANTINEOPLASTIC RADIATION THERAPY: ICD-10-CM

## 2024-07-31 LAB
RAD ONC MSQ ACTUAL FRACTIONS DELIVERED: 2
RAD ONC MSQ ACTUAL SESSION DELIVERED DOSE: 750 CGRAY
RAD ONC MSQ ACTUAL TOTAL DOSE: 1500 CGRAY
RAD ONC MSQ ELAPSED DAYS: 2
RAD ONC MSQ LAST DATE: NORMAL
RAD ONC MSQ PRESCRIBED FRACTIONAL DOSE: 750 CGRAY
RAD ONC MSQ PRESCRIBED NUMBER OF FRACTIONS: 5
RAD ONC MSQ PRESCRIBED TECHNIQUE: NORMAL
RAD ONC MSQ PRESCRIBED TOTAL DOSE: 3750 CGRAY
RAD ONC MSQ START DATE: NORMAL
RAD ONC MSQ TREATMENT COURSE NUMBER: 1
RAD ONC MSQ TREATMENT SITE: NORMAL

## 2024-07-31 PROCEDURE — 77336 RADIATION PHYSICS CONSULT: CPT | Performed by: RADIOLOGY

## 2024-07-31 PROCEDURE — 77373 STRTCTC BDY RAD THER TX DLVR: CPT | Performed by: RADIOLOGY

## 2024-08-01 ENCOUNTER — TELEPHONE (OUTPATIENT)
Dept: RADIATION ONCOLOGY | Facility: CLINIC | Age: 69
End: 2024-08-01
Payer: COMMERCIAL

## 2024-08-01 NOTE — TELEPHONE ENCOUNTER
Called patient with Dr. Gardner's recommendations.  He can start taking Advil 600mg TID. If that doesn't help, please contact office and Dr. Gardner will send a medrol dose-pk.    Patient appreciative of the advise.    LORENZO MERIDA RN

## 2024-08-01 NOTE — TELEPHONE ENCOUNTER
Pt had radiation yesterday and states his prostate is very swollen.  He has to urinate and have a bowel movement but it is so painful that is not able to urinate or have a bowel movement.  Please call the patient [phone]   Please advise.

## 2024-08-01 NOTE — TELEPHONE ENCOUNTER
"Spoke with patient.    Verified name/.    Completed FX 2/ on 24 for prostate radiation.  Next TX scheduled for 24.    Increased pressure in prostate started yesterday evening.  Overnight only able to dribble to urinate.    He voided this morning, \"10-15 second weak stream\" he reports relief.  Reports mild dysuria. Prostate pressure has eased up, not completely gone.      Last BM yesterday evening, \"minor\" in size.  Denies blood or mucous in the stool.    He has taken Flomax in the past, but stopped due to leg weakness side effects. Currently taking Cialis 5mg PO daily.     Patient is requesting further medical intervention.    Please advise,  LORENZO MERIDA RN    "

## 2024-08-02 ENCOUNTER — HOSPITAL ENCOUNTER (OUTPATIENT)
Dept: RADIATION ONCOLOGY | Facility: CLINIC | Age: 69
Setting detail: RADIATION/ONCOLOGY SERIES
Discharge: HOME | End: 2024-08-02
Payer: COMMERCIAL

## 2024-08-02 DIAGNOSIS — C61 MALIGNANT NEOPLASM OF PROSTATE (MULTI): ICD-10-CM

## 2024-08-02 DIAGNOSIS — Z51.0 ENCOUNTER FOR ANTINEOPLASTIC RADIATION THERAPY: ICD-10-CM

## 2024-08-02 LAB
RAD ONC MSQ ACTUAL FRACTIONS DELIVERED: 3
RAD ONC MSQ ACTUAL SESSION DELIVERED DOSE: 750 CGRAY
RAD ONC MSQ ACTUAL TOTAL DOSE: 2250 CGRAY
RAD ONC MSQ ELAPSED DAYS: 4
RAD ONC MSQ LAST DATE: NORMAL
RAD ONC MSQ PRESCRIBED FRACTIONAL DOSE: 750 CGRAY
RAD ONC MSQ PRESCRIBED NUMBER OF FRACTIONS: 5
RAD ONC MSQ PRESCRIBED TECHNIQUE: NORMAL
RAD ONC MSQ PRESCRIBED TOTAL DOSE: 3750 CGRAY
RAD ONC MSQ START DATE: NORMAL
RAD ONC MSQ TREATMENT COURSE NUMBER: 1
RAD ONC MSQ TREATMENT SITE: NORMAL

## 2024-08-02 PROCEDURE — 77373 STRTCTC BDY RAD THER TX DLVR: CPT | Performed by: RADIOLOGY

## 2024-08-05 ENCOUNTER — HOSPITAL ENCOUNTER (OUTPATIENT)
Dept: RADIOLOGY | Facility: CLINIC | Age: 69
Discharge: HOME | End: 2024-08-05
Payer: COMMERCIAL

## 2024-08-05 ENCOUNTER — HOSPITAL ENCOUNTER (OUTPATIENT)
Dept: RADIATION ONCOLOGY | Facility: CLINIC | Age: 69
Setting detail: RADIATION/ONCOLOGY SERIES
Discharge: HOME | End: 2024-08-05
Payer: COMMERCIAL

## 2024-08-05 DIAGNOSIS — R20.0 ANESTHESIA OF SKIN: ICD-10-CM

## 2024-08-05 DIAGNOSIS — Z51.0 ENCOUNTER FOR ANTINEOPLASTIC RADIATION THERAPY: ICD-10-CM

## 2024-08-05 DIAGNOSIS — C61 MALIGNANT NEOPLASM OF PROSTATE (MULTI): ICD-10-CM

## 2024-08-05 DIAGNOSIS — R29.898 OTHER SYMPTOMS AND SIGNS INVOLVING THE MUSCULOSKELETAL SYSTEM: ICD-10-CM

## 2024-08-05 LAB
RAD ONC MSQ ACTUAL FRACTIONS DELIVERED: 4
RAD ONC MSQ ACTUAL SESSION DELIVERED DOSE: 750 CGRAY
RAD ONC MSQ ACTUAL TOTAL DOSE: 3000 CGRAY
RAD ONC MSQ ELAPSED DAYS: 7
RAD ONC MSQ LAST DATE: NORMAL
RAD ONC MSQ PRESCRIBED FRACTIONAL DOSE: 750 CGRAY
RAD ONC MSQ PRESCRIBED NUMBER OF FRACTIONS: 5
RAD ONC MSQ PRESCRIBED TECHNIQUE: NORMAL
RAD ONC MSQ PRESCRIBED TOTAL DOSE: 3750 CGRAY
RAD ONC MSQ START DATE: NORMAL
RAD ONC MSQ TREATMENT COURSE NUMBER: 1
RAD ONC MSQ TREATMENT SITE: NORMAL

## 2024-08-05 PROCEDURE — 2550000001 HC RX 255 CONTRASTS: Performed by: INTERNAL MEDICINE

## 2024-08-05 PROCEDURE — 72158 MRI LUMBAR SPINE W/O & W/DYE: CPT

## 2024-08-05 PROCEDURE — A9575 INJ GADOTERATE MEGLUMI 0.1ML: HCPCS | Performed by: INTERNAL MEDICINE

## 2024-08-05 PROCEDURE — 77373 STRTCTC BDY RAD THER TX DLVR: CPT | Performed by: RADIOLOGY

## 2024-08-05 PROCEDURE — 72158 MRI LUMBAR SPINE W/O & W/DYE: CPT | Performed by: RADIOLOGY

## 2024-08-05 RX ORDER — GADOTERATE MEGLUMINE 376.9 MG/ML
19 INJECTION INTRAVENOUS
Status: COMPLETED | OUTPATIENT
Start: 2024-08-05 | End: 2024-08-05

## 2024-08-07 ENCOUNTER — APPOINTMENT (OUTPATIENT)
Dept: RADIATION ONCOLOGY | Facility: CLINIC | Age: 69
End: 2024-08-07
Payer: COMMERCIAL

## 2024-08-12 ENCOUNTER — APPOINTMENT (OUTPATIENT)
Dept: RADIATION ONCOLOGY | Facility: CLINIC | Age: 69
End: 2024-08-12
Payer: COMMERCIAL

## 2024-08-13 ENCOUNTER — HOSPITAL ENCOUNTER (OUTPATIENT)
Dept: RADIATION ONCOLOGY | Facility: CLINIC | Age: 69
Setting detail: RADIATION/ONCOLOGY SERIES
Discharge: HOME | End: 2024-08-13
Payer: COMMERCIAL

## 2024-08-13 ENCOUNTER — TELEPHONE (OUTPATIENT)
Dept: RADIATION ONCOLOGY | Facility: CLINIC | Age: 69
End: 2024-08-13

## 2024-08-13 ENCOUNTER — RADIATION ONCOLOGY OTV (OUTPATIENT)
Dept: RADIATION ONCOLOGY | Facility: CLINIC | Age: 69
End: 2024-08-13

## 2024-08-13 ENCOUNTER — DOCUMENTATION (OUTPATIENT)
Dept: RADIATION ONCOLOGY | Facility: CLINIC | Age: 69
End: 2024-08-13

## 2024-08-13 DIAGNOSIS — Z51.0 ENCOUNTER FOR ANTINEOPLASTIC RADIATION THERAPY: ICD-10-CM

## 2024-08-13 DIAGNOSIS — R33.9 INCOMPLETE BLADDER EMPTYING: ICD-10-CM

## 2024-08-13 DIAGNOSIS — C61 MALIGNANT NEOPLASM OF PROSTATE (MULTI): ICD-10-CM

## 2024-08-13 LAB
RAD ONC MSQ ACTUAL FRACTIONS DELIVERED: 5
RAD ONC MSQ ACTUAL SESSION DELIVERED DOSE: 750 CGRAY
RAD ONC MSQ ACTUAL TOTAL DOSE: 3750 CGRAY
RAD ONC MSQ ELAPSED DAYS: 15
RAD ONC MSQ LAST DATE: NORMAL
RAD ONC MSQ PRESCRIBED FRACTIONAL DOSE: 750 CGRAY
RAD ONC MSQ PRESCRIBED NUMBER OF FRACTIONS: 5
RAD ONC MSQ PRESCRIBED TECHNIQUE: NORMAL
RAD ONC MSQ PRESCRIBED TOTAL DOSE: 3750 CGRAY
RAD ONC MSQ START DATE: NORMAL
RAD ONC MSQ TREATMENT COURSE NUMBER: 1
RAD ONC MSQ TREATMENT SITE: NORMAL

## 2024-08-13 PROCEDURE — 77373 STRTCTC BDY RAD THER TX DLVR: CPT | Performed by: RADIOLOGY

## 2024-08-13 RX ORDER — TAMSULOSIN HYDROCHLORIDE 0.4 MG/1
0.4 CAPSULE ORAL DAILY
Qty: 30 CAPSULE | Refills: 11 | Status: SHIPPED | OUTPATIENT
Start: 2024-08-13 | End: 2025-08-08

## 2024-08-13 NOTE — TELEPHONE ENCOUNTER
Pt requesting a prescription of tamsulosin (Flomax) 0.4 mg 24 hr capsule be sent to [facility] in [facility] ph. [phone].    KAYLAI -Virtual [facility] scheduled for Jan as pt unavailable in Dec during the 4 month ananth.

## 2024-08-13 NOTE — PROGRESS NOTES
Radiation Oncology On Treatment Visit    Patient Name:  Mauri Sanchez  MRN:  [id number]  :  1955    Referring Provider: No ref. provider found  Primary Care Provider: Oswaldo Allen MD  Care Team: Patient Care Team:  Oswaldo Allen MD as PCP - General    Date of Service: 2024     Diagnosis:   Specialty Problems          Radiation Oncology Problems    Prostate cancer (Multi)         Treatment Summary:  SBRT: Prostate    Treatment Period Technique Fraction Dose Fractions Total Dose   Course 1 2024-2024  (days elapsed: 15)         Pros+SV 2024-2024 SBRT 750 / 750 cGy 5 / 5 3750 / 3,750 cGy     SUBJECTIVE: Patient last treatment SBRT today.  Patient tolerating radiation treatment without major complaint. Had penile and urethral/scrotal swelling after the first fraction. Better with Flomax. No pain, taking 600 mg NSAIDs TID. . Denies fatigue, skin changes, pain, itchiness. No other issues this week. Toxicity as noted below.      OBJECTIVE:   Vital Signs:  There were no vitals taken for this visit.   Pain Scale: The patient's current pain level was assessed.  They report currently having a pain of 0 out of 10.    Other Pertinent Findings:          Assessment / Plan:  The patient is tolerating radiation therapy as anticipated.  Continue per current treatment plan.   RV in 3 months with PSA    Ellis Valles MD, PhD  Attending Physician

## 2024-08-16 NOTE — PROGRESS NOTES
Radiation Oncology Treatment Summary    Patient Name:  Mauri Sanchez  MRN:  [id number]  :  1955    Referring Provider: No ref. provider found  Primary Care Provider: Oswaldo Allen MD    Brief History: Mauri Sanchez is a 68 y.o. male with Prostate cancer (Multi), Clinical: Stage IIB (cT1c, cN0, cM0, PSA: 15.8, Grade Group: 2).  The patient completed radiotherapy as outlined below.    Radiation Treatment Summary:    SBRT: Prostate    Treatment Period Technique Fraction Dose Fractions Total Dose   Course 1 2024-2024  (days elapsed: 15)         Pros+SV 2024-2024 SBRT 750 / 750 cGy 5  3750 / 3,750 cGy       Please see the patient's Mosaiq chart for further details regarding the radiation plan, including beam energy.    Concurrent Chemotherapy:  Treatment Plans       No treatment plans exist          CTCAE Toxicity Overview:   Toxicity Assessment          2024    10:00   Toxicity Assessment   Adverse Events Reviewed (WDL) Yes (Within Defined Limits)   Treatment Site Pelvis - male     Patient Disposition: Follow up 25 Dayday EDMOND

## 2024-09-11 ENCOUNTER — HOSPITAL ENCOUNTER (OUTPATIENT)
Dept: RADIATION ONCOLOGY | Facility: CLINIC | Age: 69
Setting detail: RADIATION/ONCOLOGY SERIES
Discharge: HOME | End: 2024-09-11
Payer: COMMERCIAL

## 2024-09-11 VITALS
OXYGEN SATURATION: 98 % | WEIGHT: 199.52 LBS | SYSTOLIC BLOOD PRESSURE: 171 MMHG | BODY MASS INDEX: 27.06 KG/M2 | TEMPERATURE: 96.3 F | RESPIRATION RATE: 18 BRPM | DIASTOLIC BLOOD PRESSURE: 90 MMHG | HEART RATE: 57 BPM

## 2024-09-11 DIAGNOSIS — N30.40 RADIATION CYSTITIS: ICD-10-CM

## 2024-09-11 DIAGNOSIS — R55 SYNCOPE, UNSPECIFIED SYNCOPE TYPE: ICD-10-CM

## 2024-09-11 DIAGNOSIS — C61 PROSTATE CANCER (MULTI): Primary | ICD-10-CM

## 2024-09-11 PROCEDURE — 99215 OFFICE O/P EST HI 40 MIN: CPT | Performed by: RADIOLOGY

## 2024-09-11 RX ORDER — METHYLPREDNISOLONE 4 MG/1
TABLET ORAL
Qty: 21 TABLET | Refills: 0 | Status: SHIPPED | OUTPATIENT
Start: 2024-09-11 | End: 2024-09-18

## 2024-09-11 ASSESSMENT — ENCOUNTER SYMPTOMS
DEPRESSION: 0
OCCASIONAL FEELINGS OF UNSTEADINESS: 0
LOSS OF SENSATION IN FEET: 1

## 2024-09-11 ASSESSMENT — PAIN SCALES - GENERAL: PAINLEVEL: 0-NO PAIN

## 2024-09-11 NOTE — PROGRESS NOTES
Radiation Oncology Follow-Up    Patient Name:  Mauri Sanchez  MRN:  [id number]  :  1955    Referring Provider: Elliott Sanchez, *  Primary Care Provider: Oswaldo Allen MD  Care Team: Patient Care Team:  Oswaldo Allen MD as PCP - General    Date of Service: 2024     SUMMARY: 68 y.o. male with unfavorable-intermediate risk prostate cancer, [id number], [place] score 3+4=7, 7/14 positive cores, pPSA 15.8, Grade group 2 s/p SADT with SBRT    SUBJECTIVE  History of Present Illness:   Mauri Sanchez is a 68 y.o. male who is presenting today for follow-up. Prior radiation treatment as follows:    Radiation Treatments       Active   No active radiation treatments to show.     Completed   Pros+SV (Started on 2024)   Most recent fraction: 750 cGy given on 2024   Total given: 3,750 cGy / 3,750 cGy  (5 of 5 fractions)   Elapsed Days: 15   Technique: SBRT                     INTERIM HISTORY:  Patient had syncope secondary to low blood pressure and stopped flomax and cialis along with all his BP meds. Has Nocturia x10-15 now. He also reports some diarrhea alternating with constipation when he takes 1 dose of imodium. He also reports feeling of pressure/soreness of his prostate. Moreover, he reports LE weakness and numbness in his legs which was worked up by PCP without a proximate cause. He reports some increased fatigue as well     Most recent PSA     Lab Results   Component Value Date    PSA 15.8 (H) 2024         Review of Systems:    A 14-point review of systems is negative except for what's listed in the history of present illness    Performance Status:  2        OBJECTIVE  Vital Signs:  /90 (BP Location: Right arm, Patient Position: Sitting, BP Cuff Size: Adult)   Pulse 57   Temp 35.7 °C (96.3 °F) (Temporal)   Resp 18   Wt 90.5 kg (199 lb 8.3 oz)   SpO2 98%   BMI 27.06 kg/m²    Physical Exam:  Constitutional: Awake, alert and oriented. No acute distress. Appears stated  age.  Eyes: Conjunctivae are clear without exudates or hemorrhage. Eyelids are normal in appearance without swelling or lesions.  ENMT: mucous membranes moist, no apparent injury, no lesions seen  Neck: Neck supple, no apparent injury, trachea midline  Resp/Thorax: Patent airways,  good chest expansion. Thorax symmetric  Cardiovascular: The external chest is normal without lifts, heaves, or thrills. PMI is not visible.  GI: Nondistended, soft, non-tender.  /Gyn: Deferred  MSK: No tenderness noted on palpation of the spine. No ROM limitations.  Extremities: normal extremities, no cyanosis, erythema or edema.  Neurological: alert and oriented x3. Sensory and motor function appear intact. No gait abnormality observed.  Psych: Appropriate mood and behavior.  Skin: Warm and dry, no new lesions or rashes.      Lab Results   Component Value Date    PSA 15.8 (H) 03/28/2024    PSA 9.0 (H) 04/11/2023    TESTOSTERONE 661 03/28/2024            ASSESSMENT:  Mauri Sanchez is a 68 y.o. male with Prostate cancer (Multi), Clinical: Stage IIB (cT1c, cN0, cM0, PSA: 15.8, Grade Group: 2).      The patient continues to experience radiation side effects     PLAN:    The patient will follow-up with radiation oncology in January   Referral to cardiology placed for syncope and management of his BP  Sent a medrol dosepk to pharmacy   Recommended purchasing BP machine, measuring TID and slowly titrating up his meds to avoid SBP <130  Recommended OTC psyllium to help with rectal symptoms       NCCN Guidelines were applicable to guide this patients treatment plan.  Meredith Gardner MD       Future Appointments   Date Time Provider Department Center   10/3/2024  2:45 PM Marcelino Burnett MD [id number] [place]   1/13/2025  8:30 AM Shade Corrales MD [id number] [place]   1/13/2025  9:00 AM JULIANN Larsen-CNP [id number] [facility]

## 2024-09-13 ENCOUNTER — DOCUMENTATION (OUTPATIENT)
Dept: RADIATION ONCOLOGY | Facility: HOSPITAL | Age: 69
End: 2024-09-13
Payer: COMMERCIAL

## 2024-09-13 NOTE — PROGRESS NOTES
Patient called today (9/13/24) expressing concern regarding bilateral leg numbness (L worse than R) following Flomax, but Q15min urination whenever taking Flomax.  Of note, he reports having had a spine MRI approximately 3 weeks ago negative for spinal cord compression.  He last took Flomax on Wednesday evening (9/11/24).  Discussed plan for him to take Flomax tonight (9/13/24) and Sunday evening (9/15/24) with him documenting voiding frequency Monday in MyChart.  Conveyed he is welcome to come to the ED for symptoms.

## 2024-10-03 ENCOUNTER — APPOINTMENT (OUTPATIENT)
Dept: CARDIOLOGY | Facility: CLINIC | Age: 69
End: 2024-10-03
Payer: COMMERCIAL

## 2024-10-03 VITALS
HEART RATE: 63 BPM | WEIGHT: 198 LBS | HEIGHT: 72 IN | BODY MASS INDEX: 26.82 KG/M2 | SYSTOLIC BLOOD PRESSURE: 98 MMHG | DIASTOLIC BLOOD PRESSURE: 48 MMHG

## 2024-10-03 DIAGNOSIS — F17.200 NICOTINE DEPENDENCE, UNCOMPLICATED, UNSPECIFIED NICOTINE PRODUCT TYPE: ICD-10-CM

## 2024-10-03 DIAGNOSIS — E78.5 DYSLIPIDEMIA: ICD-10-CM

## 2024-10-03 DIAGNOSIS — Z95.1 S/P CABG (CORONARY ARTERY BYPASS GRAFT): ICD-10-CM

## 2024-10-03 DIAGNOSIS — I25.10 CORONARY ARTERY DISEASE INVOLVING NATIVE CORONARY ARTERY OF NATIVE HEART WITHOUT ANGINA PECTORIS: Primary | ICD-10-CM

## 2024-10-03 DIAGNOSIS — R55 SYNCOPE, UNSPECIFIED SYNCOPE TYPE: ICD-10-CM

## 2024-10-03 DIAGNOSIS — I25.2 OLD MI (MYOCARDIAL INFARCTION): ICD-10-CM

## 2024-10-03 DIAGNOSIS — I73.9 CLAUDICATION (CMS-HCC): ICD-10-CM

## 2024-10-03 PROCEDURE — 99205 OFFICE O/P NEW HI 60 MIN: CPT | Performed by: INTERNAL MEDICINE

## 2024-10-03 PROCEDURE — 4004F PT TOBACCO SCREEN RCVD TLK: CPT | Performed by: INTERNAL MEDICINE

## 2024-10-03 PROCEDURE — 93000 ELECTROCARDIOGRAM COMPLETE: CPT | Performed by: INTERNAL MEDICINE

## 2024-10-03 PROCEDURE — 3008F BODY MASS INDEX DOCD: CPT | Performed by: INTERNAL MEDICINE

## 2024-10-03 PROCEDURE — 1159F MED LIST DOCD IN RCRD: CPT | Performed by: INTERNAL MEDICINE

## 2024-10-03 RX ORDER — CHOLECALCIFEROL (VITAMIN D3) 50 MCG
2000 TABLET ORAL DAILY
COMMUNITY

## 2024-10-03 NOTE — PROGRESS NOTES
Referred by Dr. Gardner for Please see below.     History Of Present Illness:    Mauri Sanchez is a 69 y.o. male presenting with syncope, CAD.    I am seeing this 69-year-old hypertensive, hyperlipidemic 15-pack-year smoker with a strong family history of premature coronary artery disease (3 siblings with CAD in their 40s) at his request for evaluation of syncope and a history of CAD.    The patient's cardiac history dates back to 2003 when he sustained a myocardial infarction and cardiac arrest.  He went on to have CABG x 3 (LIMA-LAD; SVG-PDA; SVG-OM).  He also is status post prior stent with BMS of the OM in the remote past.  He previously followed with Dr. Fernandez, but has not seen him in several years.  His most recent SPECT available in the electronic medical record is from July 2017 at which time he was found to have a large fixed anterolateral infarct with an ejection fraction of 46%.  Of note he has a 4.4 cm abdominal aortic aneurysm by [facility] imaging in July 2024.    While vacationing in [place] in early September, he was at a restaurant sitting at a table when he started to experience numbness in his left leg.  He started to fixate on this feeling, and the next thing he knew he had passed out.  Bystanders and his wife were there and helped him.  One of the bystanders was a cardiologist who initiated workup that included labs and an EKG.  The patient was told very likely he passed out because of Sahra adequate fluid intake and because he was taking his bisoprolol, tadalafil, lisinopril, and tamsulosin, or some combination thereof simultaneously.  He typically does not drink water, but has 5 cups of coffee per day, and perhaps 10 oz of noncaffeinated, nonalcholic beverage per day.    From time to time he will have an uncomfortable feeling in his left leg.  Sometimes it feels like a numbness.  He recently returned from a trip to [facility] where he was walking frequently.  He denies symptoms of angina, dyspnea,  palpitations, orthopnea, PND.    Episodic left leg numbness upon standing after sitting.      He recently returned from a trip to [place], and was walking 5K per day.      He has a history of 4.4 cm AAA by MR 7/2024      PMH Prostate CA treated for with XRT; skin CA; lumbar disc disease      Past Medical History:  He has a past medical history of Basal cell carcinoma, GERD (gastroesophageal reflux disease), Hypercholesteremia, Hypertension, Myocardial infarction (Multi), Prostate cancer (Multi), and Squamous cell carcinoma of hand, left.    Past Surgical History:  He has a past surgical history that includes Skin cancer excision; Cardiac surgery; and Hernia repair.      Social History:  He reports that he has been smoking cigarettes. He has never been exposed to tobacco smoke. He has never used smokeless tobacco. He reports current alcohol use of about 5.0 standard drinks of alcohol per week. He reports that he does not use drugs.    Family History:  Family History   Problem Relation Name Age of Onset    Skin cancer Mother      Other (cardiac stent) Mother      Skin cancer Father      Other (cardiac bypass surgery) Father      Other (cardiac bypass surgery) Sister      Other (cardiac stent) Brother          Allergies:  Codeine and Sulfa (sulfonamide antibiotics)    Outpatient Medications:  Current Outpatient Medications   Medication Instructions    aspirin 81 mg, oral, Every other day    atorvastatin (LIPITOR) 10 mg, oral, Daily    bisoprolol (Zebeta) 5 mg tablet TAKE ONE TABLET BY MOUTH EVERY DAY FOR 90 DAYS    chlordiazepoxide-clidinium (Librax) 5-2.5 mg capsule TAKE ONE CAPSULE BY MOUTH DAILY FOR 90 DAYS    cholecalciferol (VITAMIN D3) 2,000 Units, oral, Daily    lisinopril 10 mg, oral, Daily    omeprazole OTC (PRILOSEC OTC) 20 mg, oral, Daily before breakfast, Do not crush, chew, or split.    tadalafil (CIALIS) 5 mg, oral, Daily    tamsulosin (FLOMAX) 0.4 mg, oral, Daily    ZINC ORAL 1 capsule, oral, Daily       "  Last Recorded Vitals:  Vitals:    10/03/24 1500   BP: (!) 98/48   BP Location: Left arm   Patient Position: Sitting   Pulse: 63   Weight: 89.8 kg (198 lb)   Height: 1.829 m (6')       Physical Exam:  GENERAL:  pleasant 69 year-old  HEENT: No xanthelasma  NECK: Supple, no palpable adenopathy or thyromegaly  CHEST: Clear to auscultation, respiratory effort unlabored  CARDIAC: Intermittently irregular, normal S1 and S2, no audible  rub, gallop, carotids are brisk, PMI is not displaced; there is a 1/6 systolic murmur at the apex.  ABD: Active bowel sounds, nontender, no organomegaly, no evidence of ascites  EXT: No clubbing, cyanosis, edema, or tenderness  NEURO: Awake, alert, appropriate, speech is fluent         Last Labs:  CBC -  No results found for: \"WBC\", \"HGB\", \"HCT\", \"MCV\", \"PLT\"    CMP -  No results found for: \"CALCIUM\", \"PHOS\", \"PROT\", \"ALBUMIN\", \"AST\", \"ALT\", \"ALKPHOS\", \"BILITOT\"    LIPID PANEL -   No results found for: \"CHOL\", \"TRIG\", \"HDL\", \"CHHDL\", \"LDLF\", \"VLDL\", \"NHDL\"    RENAL FUNCTION PANEL -   No results found for: \"GLUCOSE\", \"NA\", \"K\", \"CL\", \"CO2\", \"ANIONGAP\", \"BUN\", \"CREATININE\", \"GFRMALE\", \"CALCIUM\", \"PHOS\", \"ALBUMIN\"     No results found for: \"BNP\", \"HGBA1C\"      Lab review: I have Chemistry CMP: No results found for: \"ALBUMIN\", \"CALCIUM\", \"CO2\", \"CREATININE\", \"GLUCOSE\", \"BILITOT\", \"PROT\", \"ALT\", \"AST\", \"GGT\", \"ALKPHOS\", Chemistry BMP No results found for: \"GLUCOSE\", \"CALCIUM\", \"CO2\", \"CREATININE\", and CBC:No results found for: \"WBC\", \"RBC\", \"HGB\", \"HCT\", \"MCV\", \"MCH\", \"MCHC\", \"RDW\", \"RDWCV\", \"RDWSD\", \"MPV\", \"NRBC\"    Assessment/Plan   Assessment & Plan  Syncope, unspecified syncope type    Orders:    Referral to Cardiology    ECG 12 lead (Clinic Performed)    Holter Or Event Cardiac Monitor; Future    Follow Up In Cardiology; Future    Transthoracic Echo Complete; Future    Coronary artery disease involving native coronary artery of native heart without angina pectoris    Orders:    Nuclear " Stress Test; Future    Follow Up In Cardiology; Future    Lipid panel; Future    Nicotine dependence, uncomplicated, unspecified nicotine product type    Orders:    Referral to Tobacco Cessation Counseling; Future    Dyslipidemia    Orders:    Follow Up In Cardiology; Future    S/P CABG (coronary artery bypass graft)    Orders:    Follow Up In Cardiology; Future    Old MI (myocardial infarction)    Orders:    Follow Up In Cardiology; Future    Transthoracic Echo Complete; Future    Claudication ([facility])    Orders:    Vascular US ankle brachial index (TOM) without exercise; Future      This 69-year-old hypertensive, hyperlipidemic 15-pack-year smoker with a family history of premature CAD has a personal history of CAD with prior MI and cardiac arrest in 2003 with subsequent CABG x 3 ((LIMA-LAD; SVG-PDA; SVG-OM).  He is also status post BMS of the OM in the remote past.  His most recent SPECT in July 2017 disclosed a fixed inferolateral defect.  He has a history of a 4.4 cm abdominal aortic aneurysm.  He sustained a syncopal episode while in [place] in early September which sounds vasovagal in origin.  He has an abnormal EKG as described, and ventricular arrhythmias are also a possibility.  He has symptoms of episodic discomfort in his leg which may be claudicate of versus pseudoclaudication.  Our approach:    1.  Echocardiogram  2.  Pharmacologic stress  3.  Monitor study  4.  Lipid profile  5.  Advised him to separate his medications rather than taking them all simultaneously  6.  Advised him to drink at least 64 to 80 ounces of noncaffeinated, nonalcoholic beverages per day  7.  He will need periodic imaging of his abdominal aortic aneurysm and referral to vascular.  We will discuss this at his next appointment.  8.  Above all else, I advised the patient to quit tobacco, or else risk certain future cardiovascular morbidity, and/or mortality.      Marcelino Burnett MD

## 2024-10-03 NOTE — ASSESSMENT & PLAN NOTE
Orders:    Nuclear Stress Test; Future    Follow Up In Cardiology; Future    Lipid panel; Future

## 2024-10-03 NOTE — ASSESSMENT & PLAN NOTE
Orders:    Referral to Cardiology    ECG 12 lead (Clinic Performed)    Holter Or Event Cardiac Monitor; Future    Follow Up In Cardiology; Future    Transthoracic Echo Complete; Future

## 2024-10-03 NOTE — PATIENT INSTRUCTIONS
"It was my pleasure to meet you.  I look forward to being your cardiologist.  I am a huge believer in communicating with my patients.  Please contact me at any time, if anything is not clear to you regarding anything we have discussed, or if new questions occur to you.     You should increase your intake of fresh fruits and vegetables.  Try to consume 9-12 servings per day of such foods.  You should increase your intake of deep sea fish such as salmon and tuna.  Try to get two servings per week of fish, but if you are a pregnant woman, talk to your obstetrician before increasing your fish intake.  You should increase your intake of unprocessed nuts such as walnuts or almonds.  Increase your intake of plant-based protein.  You should avoid fried foods.  Don't consume sugary or starchy foods and sugary drinks.  Avoid saturated fats.  Try not to dine at restaurants more than once per month, and don't dine at fast food places.  Try to get 7-9 hours of sleep every night.  Try to get 150 minutes per week of moderate intensity exercise (after I have cleared you to start an exercise program).  Try to maintain the appropriate weight for your height based on body mass index (BMI). Maintain your cholesterol, blood sugar, and blood pressure in the recommended respective normal ranges.  There is a wealth of information on the American [employer] Association's website regarding this.  Just Google \"Life's Essential 8\" for more information.   Ask me about any of these details  if you have questions.    As your cardiologist, I will be available to you at any time to answer any question you have concerning your heart health.  My staff, Teresa can also answer any questions you may have.  Best of luck.       It is important for us to have an accurate list of the medications, supplements, and their doses.  It is also important for us to have an accurate list of your allergies.  Please bring this information to every appointment.  " This is a vital part of the quality of care you receive through all of your providers.

## 2024-10-09 ENCOUNTER — HOSPITAL ENCOUNTER (OUTPATIENT)
Dept: CARDIOLOGY | Facility: HOSPITAL | Age: 69
Discharge: HOME | End: 2024-10-09
Payer: COMMERCIAL

## 2024-10-09 DIAGNOSIS — R55 SYNCOPE, UNSPECIFIED SYNCOPE TYPE: ICD-10-CM

## 2024-10-09 DIAGNOSIS — I25.2 OLD MI (MYOCARDIAL INFARCTION): ICD-10-CM

## 2024-10-09 DIAGNOSIS — I25.10 CORONARY ARTERY DISEASE INVOLVING NATIVE CORONARY ARTERY OF NATIVE HEART WITHOUT ANGINA PECTORIS: Primary | ICD-10-CM

## 2024-10-09 PROCEDURE — 93306 TTE W/DOPPLER COMPLETE: CPT | Performed by: INTERNAL MEDICINE

## 2024-10-09 PROCEDURE — 93246 EXT ECG>7D<15D RECORDING: CPT

## 2024-10-09 PROCEDURE — 93306 TTE W/DOPPLER COMPLETE: CPT

## 2024-10-10 ENCOUNTER — HOSPITAL ENCOUNTER (OUTPATIENT)
Dept: CARDIOLOGY | Facility: HOSPITAL | Age: 69
Discharge: HOME | End: 2024-10-10
Payer: COMMERCIAL

## 2024-10-10 ENCOUNTER — LAB (OUTPATIENT)
Dept: LAB | Facility: LAB | Age: 69
End: 2024-10-10
Payer: COMMERCIAL

## 2024-10-10 ENCOUNTER — APPOINTMENT (OUTPATIENT)
Dept: RADIOLOGY | Facility: HOSPITAL | Age: 69
End: 2024-10-10
Payer: COMMERCIAL

## 2024-10-10 ENCOUNTER — APPOINTMENT (OUTPATIENT)
Dept: CARDIOLOGY | Facility: HOSPITAL | Age: 69
End: 2024-10-10
Payer: COMMERCIAL

## 2024-10-10 DIAGNOSIS — I25.10 CORONARY ARTERY DISEASE INVOLVING NATIVE CORONARY ARTERY OF NATIVE HEART WITHOUT ANGINA PECTORIS: ICD-10-CM

## 2024-10-10 LAB
CHOLEST SERPL-MCNC: 204 MG/DL (ref 0–199)
CHOLESTEROL/HDL RATIO: 6
HDLC SERPL-MCNC: 33.9 MG/DL
LDLC SERPL CALC-MCNC: 117 MG/DL
NON HDL CHOLESTEROL: 170 MG/DL (ref 0–149)
TRIGL SERPL-MCNC: 267 MG/DL (ref 0–149)
VLDL: 53 MG/DL (ref 0–40)

## 2024-10-10 PROCEDURE — 93017 CV STRESS TEST TRACING ONLY: CPT

## 2024-10-10 PROCEDURE — 80061 LIPID PANEL: CPT

## 2024-10-10 PROCEDURE — 36415 COLL VENOUS BLD VENIPUNCTURE: CPT

## 2024-10-11 LAB
AORTIC VALVE MEAN GRADIENT: 3 MMHG
AORTIC VALVE PEAK VELOCITY: 1.06 M/S
AV PEAK GRADIENT: 4.5 MMHG
AVA (PEAK VEL): 2.61 CM2
AVA (VTI): 2.62 CM2
EJECTION FRACTION APICAL 4 CHAMBER: 44.4
EJECTION FRACTION: 51 %
LEFT VENTRICLE INTERNAL DIMENSION DIASTOLE: 5.48 CM (ref 3.5–6)
LEFT VENTRICULAR OUTFLOW TRACT DIAMETER: 2.1 CM
LV EJECTION FRACTION BIPLANE: 51 %
MITRAL VALVE E/A RATIO: 0.96
MITRAL VALVE E/E' RATIO: 9.7
RIGHT VENTRICLE FREE WALL PEAK S': 10.2 CM/S
TRICUSPID ANNULAR PLANE SYSTOLIC EXCURSION: 1.6 CM

## 2024-10-16 ENCOUNTER — APPOINTMENT (OUTPATIENT)
Dept: CARDIOLOGY | Facility: HOSPITAL | Age: 69
End: 2024-10-16
Payer: COMMERCIAL

## 2024-10-16 ENCOUNTER — LAB REQUISITION (OUTPATIENT)
Dept: LAB | Facility: HOSPITAL | Age: 69
End: 2024-10-16
Payer: COMMERCIAL

## 2024-10-16 DIAGNOSIS — H44.002 UNSPECIFIED PURULENT ENDOPHTHALMITIS, LEFT EYE: ICD-10-CM

## 2024-10-16 PROCEDURE — 87075 CULTR BACTERIA EXCEPT BLOOD: CPT

## 2024-10-16 PROCEDURE — 87070 CULTURE OTHR SPECIMN AEROBIC: CPT

## 2024-10-16 PROCEDURE — 87205 SMEAR GRAM STAIN: CPT

## 2024-10-17 ENCOUNTER — LAB REQUISITION (OUTPATIENT)
Dept: LAB | Facility: HOSPITAL | Age: 69
End: 2024-10-17
Payer: COMMERCIAL

## 2024-10-17 DIAGNOSIS — H44.002 UNSPECIFIED PURULENT ENDOPHTHALMITIS, LEFT EYE: ICD-10-CM

## 2024-10-17 PROCEDURE — 87186 SC STD MICRODIL/AGAR DIL: CPT

## 2024-10-17 PROCEDURE — 87205 SMEAR GRAM STAIN: CPT

## 2024-10-17 PROCEDURE — 87077 CULTURE AEROBIC IDENTIFY: CPT

## 2024-10-17 PROCEDURE — 87075 CULTR BACTERIA EXCEPT BLOOD: CPT

## 2024-10-17 PROCEDURE — 87070 CULTURE OTHR SPECIMN AEROBIC: CPT

## 2024-10-20 LAB
BACTERIA FLD CULT: ABNORMAL
BACTERIA FLD CULT: ABNORMAL
BACTERIA FLD CULT: NORMAL
GRAM STN SPEC: ABNORMAL
GRAM STN SPEC: NORMAL
GRAM STN SPEC: NORMAL

## 2024-10-21 LAB
BACTERIA FLD CULT: ABNORMAL
BACTERIA FLD CULT: ABNORMAL
GRAM STN SPEC: ABNORMAL

## 2024-11-08 ENCOUNTER — HOSPITAL ENCOUNTER (OUTPATIENT)
Dept: RADIOLOGY | Facility: HOSPITAL | Age: 69
Discharge: HOME | End: 2024-11-08
Payer: COMMERCIAL

## 2024-11-08 DIAGNOSIS — I73.9 CLAUDICATION (CMS-HCC): ICD-10-CM

## 2024-11-08 PROCEDURE — 93922 UPR/L XTREMITY ART 2 LEVELS: CPT | Performed by: STUDENT IN AN ORGANIZED HEALTH CARE EDUCATION/TRAINING PROGRAM

## 2024-11-08 PROCEDURE — 93922 UPR/L XTREMITY ART 2 LEVELS: CPT

## 2024-11-13 ENCOUNTER — APPOINTMENT (OUTPATIENT)
Dept: CARDIOLOGY | Facility: CLINIC | Age: 69
End: 2024-11-13
Payer: COMMERCIAL

## 2024-11-13 VITALS
BODY MASS INDEX: 27.36 KG/M2 | WEIGHT: 202 LBS | DIASTOLIC BLOOD PRESSURE: 60 MMHG | HEIGHT: 72 IN | SYSTOLIC BLOOD PRESSURE: 112 MMHG | HEART RATE: 52 BPM

## 2024-11-13 DIAGNOSIS — R55 SYNCOPE, UNSPECIFIED SYNCOPE TYPE: ICD-10-CM

## 2024-11-13 DIAGNOSIS — R60.0 BILATERAL LEG EDEMA: ICD-10-CM

## 2024-11-13 DIAGNOSIS — I25.2 OLD MI (MYOCARDIAL INFARCTION): ICD-10-CM

## 2024-11-13 DIAGNOSIS — I25.10 CORONARY ARTERY DISEASE INVOLVING NATIVE CORONARY ARTERY OF NATIVE HEART WITHOUT ANGINA PECTORIS: ICD-10-CM

## 2024-11-13 DIAGNOSIS — E78.5 DYSLIPIDEMIA: ICD-10-CM

## 2024-11-13 DIAGNOSIS — I47.29 NONSUSTAINED VENTRICULAR TACHYCARDIA (MULTI): Primary | ICD-10-CM

## 2024-11-13 DIAGNOSIS — Z95.1 S/P CABG (CORONARY ARTERY BYPASS GRAFT): ICD-10-CM

## 2024-11-13 PROCEDURE — 1160F RVW MEDS BY RX/DR IN RCRD: CPT | Performed by: INTERNAL MEDICINE

## 2024-11-13 PROCEDURE — 99215 OFFICE O/P EST HI 40 MIN: CPT | Performed by: INTERNAL MEDICINE

## 2024-11-13 PROCEDURE — 1159F MED LIST DOCD IN RCRD: CPT | Performed by: INTERNAL MEDICINE

## 2024-11-13 PROCEDURE — 4004F PT TOBACCO SCREEN RCVD TLK: CPT | Performed by: INTERNAL MEDICINE

## 2024-11-13 PROCEDURE — 3008F BODY MASS INDEX DOCD: CPT | Performed by: INTERNAL MEDICINE

## 2024-11-13 RX ORDER — ATORVASTATIN CALCIUM 80 MG/1
80 TABLET, FILM COATED ORAL DAILY
Qty: 90 TABLET | Refills: 3 | Status: SHIPPED | OUTPATIENT
Start: 2024-11-13 | End: 2025-11-13

## 2024-11-13 NOTE — ASSESSMENT & PLAN NOTE
Orders:    Follow Up In Cardiology    atorvastatin (Lipitor) 80 mg tablet; Take 1 tablet (80 mg) by mouth once daily.    Follow Up In Cardiology; Future

## 2024-11-13 NOTE — ASSESSMENT & PLAN NOTE
Orders:    Follow Up In Cardiology    Follow Up In Cardiology; Future    Referral to Cardiac Electrophysiology; Future

## 2024-11-13 NOTE — ASSESSMENT & PLAN NOTE
Orders:    Lower extremity venous insufficiency bilateral; Future    Follow Up In Cardiology; Future

## 2024-11-13 NOTE — PROGRESS NOTES
Chief Complaint:   Please see below.     History Of Present Illness:    Mauri Sanchez is a 69 y.o. male presenting with CAD.    The patient's cardiac history dates back to 2003 when he sustained a myocardial infarction and cardiac arrest.  He went on to have CABG x 3 (LIMA-LAD; SVG-PDA; SVG-OM).  He also is status post prior stent with BMS of the OM in the remote past.  He previously followed with Dr. Fernandez, but has not seen him in several years.  His most recent SPECT available in the electronic medical record is from July 2017 at which time he was found to have a large fixed anterolateral infarct with an ejection fraction of 46%.  Of note he has a 4.4 cm abdominal aortic aneurysm by MRA imaging in July 2024.   The patient's stress test on 10/10/2024 disclosed moderate PVCs, no ischemia at 7 METS.  The patient's echocardiogram dated October 9, 2024 revealed an EF of 51%, with mild inferolateral hypokinesis, without significant valvular or pericardial disease.  The patient's monitor study done 10/9-19/2024 disclosed 10 runs of SVT, the longest being 11 beats in duration, and 8 episodes of nonsustained VT, the longest being 19 beats in duration.  His TOM studies were unremarkable.      The patient denies chest discomfort, dyspnea, palpitations, orthopnea, PND, syncope, and near syncope.    He experiences lower extremity edema.  He has a history of prostate cancer. For which he is being treated with radiation.      He only drinks about 16 oz of noncaffeinated, nonalcoholic beverages per day.       Last Recorded Vitals:  Vitals:    11/13/24 1500   BP: 112/60   BP Location: Left arm   Patient Position: Sitting   Pulse: 52   Weight: 91.6 kg (202 lb)   Height: 1.829 m (6')       Past Medical History:  He has a past medical history of Basal cell carcinoma, GERD (gastroesophageal reflux disease), Hypercholesteremia, Hypertension, Myocardial infarction (Multi), Prostate cancer (Multi), and Squamous cell carcinoma of hand,  "left.    Past Surgical History:  He has a past surgical history that includes Skin cancer excision; Cardiac surgery; and Hernia repair.      Social History:  He reports that he has been smoking cigarettes. He has never been exposed to tobacco smoke. He has never used smokeless tobacco. He reports current alcohol use of about 5.0 standard drinks of alcohol per week. He reports that he does not use drugs.    Family History:  Family History   Problem Relation Name Age of Onset    Skin cancer Mother      Other (cardiac stent) Mother      Skin cancer Father      Other (cardiac bypass surgery) Father      Other (cardiac bypass surgery) Sister      Other (cardiac stent) Brother          Allergies:  Codeine and Sulfa (sulfonamide antibiotics)    Outpatient Medications:  Current Outpatient Medications   Medication Instructions    aspirin 81 mg, Every other day    atorvastatin (LIPITOR) 10 mg, Daily    bisoprolol (Zebeta) 5 mg tablet TAKE ONE TABLET BY MOUTH EVERY DAY FOR 90 DAYS    chlordiazepoxide-clidinium (Librax) 5-2.5 mg capsule TAKE ONE CAPSULE BY MOUTH DAILY FOR 90 DAYS    cholecalciferol (VITAMIN D3) 2,000 Units, Daily    lisinopril 10 mg, Daily    omeprazole OTC (PRILOSEC OTC) 20 mg, Daily before breakfast    tadalafil (CIALIS) 5 mg, oral, Daily    ZINC ORAL 1 capsule, Daily       Physical Exam:  GENERAL:  pleasant 69 year-old  HEENT: No xanthelasma  NECK: Supple, no palpable adenopathy or thyromegaly  CHEST: Clear to auscultation, respiratory effort unlabored  CARDIAC: RRR, normal S1 and S2, no audible murmur, rub, gallop, carotids are brisk, PMI is not displaced  ABD: Active bowel sounds, nontender, no organomegaly, no evidence of ascites  EXT: No clubbing, cyanosis,  or tenderness; there is 1-2+ bilateral lower extremity edema.  NEURO: Awake, alert, appropriate, speech is fluent       Last Labs:  CBC -  No results found for: \"WBC\", \"HGB\", \"HCT\", \"MCV\", \"PLT\"    CMP -  No results found for: \"CALCIUM\", \"PHOS\", " "\"PROT\", \"ALBUMIN\", \"AST\", \"ALT\", \"ALKPHOS\", \"BILITOT\"    LIPID PANEL -   Lab Results   Component Value Date    CHOL 204 (H) 10/10/2024    TRIG 267 (H) 10/10/2024    HDL 33.9 10/10/2024    CHHDL 6.0 10/10/2024    VLDL 53 (H) 10/10/2024    NHDL 170 (H) 10/10/2024       RENAL FUNCTION PANEL -   No results found for: \"GLUCOSE\", \"NA\", \"K\", \"CL\", \"CO2\", \"ANIONGAP\", \"BUN\", \"CREATININE\", \"GFRMALE\", \"CALCIUM\", \"PHOS\", \"ALBUMIN\"     No results found for: \"BNP\", \"HGBA1C\"      Diagnostic review: I have independently interpreted the Echocardiogram, Holter Monitor, and stress test.  My findings are as summarized in the respective reports.    Assessment/Plan   Assessment & Plan  Syncope, unspecified syncope type    Orders:    Follow Up In Cardiology    Follow Up In Cardiology; Future    Referral to Cardiac Electrophysiology; Future    Coronary artery disease involving native coronary artery of native heart without angina pectoris    Orders:    Follow Up In Cardiology    atorvastatin (Lipitor) 80 mg tablet; Take 1 tablet (80 mg) by mouth once daily.    Follow Up In Cardiology; Future    Dyslipidemia    Orders:    Follow Up In Cardiology    Follow Up In Cardiology; Future    S/P CABG (coronary artery bypass graft)    Orders:    Follow Up In Cardiology    Follow Up In Cardiology; Future    Old MI (myocardial infarction)    Orders:    Follow Up In Cardiology    Follow Up In Cardiology; Future    Nonsustained ventricular tachycardia (Multi)    Orders:    Follow Up In Cardiology; Future    Referral to Cardiac Electrophysiology; Future    Bilateral leg edema    Orders:    Lower extremity venous insufficiency bilateral; Future    Follow Up In Cardiology; Future          Marcelino Burnett MD  " .

## 2024-12-21 ENCOUNTER — APPOINTMENT (OUTPATIENT)
Dept: CARDIOLOGY | Facility: HOSPITAL | Age: 69
End: 2024-12-21
Payer: COMMERCIAL

## 2025-01-06 ENCOUNTER — APPOINTMENT (OUTPATIENT)
Dept: CARDIOLOGY | Facility: CLINIC | Age: 70
End: 2025-01-06
Payer: COMMERCIAL

## 2025-01-09 ENCOUNTER — HOSPITAL ENCOUNTER (OUTPATIENT)
Dept: CARDIOLOGY | Facility: HOSPITAL | Age: 70
Discharge: HOME | End: 2025-01-09
Payer: COMMERCIAL

## 2025-01-09 ENCOUNTER — TELEPHONE (OUTPATIENT)
Dept: RADIATION ONCOLOGY | Facility: HOSPITAL | Age: 70
End: 2025-01-09

## 2025-01-09 DIAGNOSIS — R60.0 BILATERAL LEG EDEMA: ICD-10-CM

## 2025-01-09 PROCEDURE — 93970 EXTREMITY STUDY: CPT

## 2025-01-09 PROCEDURE — 93970 EXTREMITY STUDY: CPT | Performed by: SURGERY

## 2025-01-13 ENCOUNTER — LAB (OUTPATIENT)
Dept: LAB | Facility: LAB | Age: 70
End: 2025-01-13
Payer: COMMERCIAL

## 2025-01-13 ENCOUNTER — APPOINTMENT (OUTPATIENT)
Dept: UROLOGY | Facility: CLINIC | Age: 70
End: 2025-01-13
Payer: COMMERCIAL

## 2025-01-13 ENCOUNTER — HOSPITAL ENCOUNTER (OUTPATIENT)
Dept: RADIATION ONCOLOGY | Facility: HOSPITAL | Age: 70
Setting detail: RADIATION/ONCOLOGY SERIES
Discharge: HOME | End: 2025-01-13
Payer: COMMERCIAL

## 2025-01-13 ENCOUNTER — OFFICE VISIT (OUTPATIENT)
Dept: CARDIOLOGY | Facility: CLINIC | Age: 70
End: 2025-01-13
Payer: COMMERCIAL

## 2025-01-13 VITALS
SYSTOLIC BLOOD PRESSURE: 138 MMHG | HEART RATE: 54 BPM | DIASTOLIC BLOOD PRESSURE: 78 MMHG | BODY MASS INDEX: 28.23 KG/M2 | WEIGHT: 208.4 LBS | HEIGHT: 72 IN

## 2025-01-13 VITALS
OXYGEN SATURATION: 98 % | BODY MASS INDEX: 28.06 KG/M2 | HEIGHT: 72 IN | DIASTOLIC BLOOD PRESSURE: 72 MMHG | HEART RATE: 56 BPM | WEIGHT: 207.2 LBS | SYSTOLIC BLOOD PRESSURE: 148 MMHG

## 2025-01-13 DIAGNOSIS — I25.2 OLD MI (MYOCARDIAL INFARCTION): ICD-10-CM

## 2025-01-13 DIAGNOSIS — I47.29 NONSUSTAINED VENTRICULAR TACHYCARDIA (MULTI): ICD-10-CM

## 2025-01-13 DIAGNOSIS — C61 PROSTATE CANCER (MULTI): Primary | ICD-10-CM

## 2025-01-13 DIAGNOSIS — Z95.1 S/P CABG (CORONARY ARTERY BYPASS GRAFT): ICD-10-CM

## 2025-01-13 DIAGNOSIS — N13.8 BPH WITH OBSTRUCTION/LOWER URINARY TRACT SYMPTOMS: ICD-10-CM

## 2025-01-13 DIAGNOSIS — N32.81 OVERACTIVE BLADDER: ICD-10-CM

## 2025-01-13 DIAGNOSIS — C61 PROSTATE CANCER (MULTI): ICD-10-CM

## 2025-01-13 DIAGNOSIS — N40.1 BPH WITH OBSTRUCTION/LOWER URINARY TRACT SYMPTOMS: ICD-10-CM

## 2025-01-13 DIAGNOSIS — C61 MALIGNANT NEOPLASM OF PROSTATE (MULTI): Primary | ICD-10-CM

## 2025-01-13 DIAGNOSIS — I25.10 CORONARY ARTERY DISEASE INVOLVING NATIVE CORONARY ARTERY OF NATIVE HEART WITHOUT ANGINA PECTORIS: Primary | ICD-10-CM

## 2025-01-13 DIAGNOSIS — R59.1 LYMPHADENOPATHY: ICD-10-CM

## 2025-01-13 LAB
PSA SERPL-MCNC: <0.1 NG/ML
TESTOST SERPL-MCNC: <30 NG/DL (ref 240–1000)

## 2025-01-13 PROCEDURE — 3008F BODY MASS INDEX DOCD: CPT | Performed by: INTERNAL MEDICINE

## 2025-01-13 PROCEDURE — 99214 OFFICE O/P EST MOD 30 MIN: CPT | Performed by: UROLOGY

## 2025-01-13 PROCEDURE — 99213 OFFICE O/P EST LOW 20 MIN: CPT | Mod: 95

## 2025-01-13 PROCEDURE — 99214 OFFICE O/P EST MOD 30 MIN: CPT | Performed by: INTERNAL MEDICINE

## 2025-01-13 PROCEDURE — 84403 ASSAY OF TOTAL TESTOSTERONE: CPT

## 2025-01-13 PROCEDURE — 1160F RVW MEDS BY RX/DR IN RCRD: CPT | Performed by: UROLOGY

## 2025-01-13 PROCEDURE — 1159F MED LIST DOCD IN RCRD: CPT | Performed by: INTERNAL MEDICINE

## 2025-01-13 PROCEDURE — 1159F MED LIST DOCD IN RCRD: CPT | Performed by: UROLOGY

## 2025-01-13 PROCEDURE — 99213 OFFICE O/P EST LOW 20 MIN: CPT

## 2025-01-13 PROCEDURE — G2211 COMPLEX E/M VISIT ADD ON: HCPCS | Performed by: UROLOGY

## 2025-01-13 PROCEDURE — 84153 ASSAY OF PSA TOTAL: CPT

## 2025-01-13 PROCEDURE — 3008F BODY MASS INDEX DOCD: CPT | Performed by: UROLOGY

## 2025-01-13 RX ORDER — DOXYCYCLINE HYCLATE 100 MG
TABLET ORAL
COMMUNITY
Start: 2024-12-09

## 2025-01-13 ASSESSMENT — ENCOUNTER SYMPTOMS
DIZZINESS: 0
DYSURIA: 0
WEAKNESS: 0
FREQUENCY: 0
PALPITATIONS: 0
SHORTNESS OF BREATH: 0
FEVER: 0
HEMATURIA: 0
BACK PAIN: 0
ALLERGIC/IMMUNOLOGIC NEGATIVE: 1
CONSTIPATION: 0
UNEXPECTED WEIGHT CHANGE: 0
DIARRHEA: 0
DIFFICULTY URINATING: 0
JOINT SWELLING: 0
ANAL BLEEDING: 0
COUGH: 0
RECTAL PAIN: 0
FATIGUE: 0
ARTHRALGIAS: 0
BLOOD IN STOOL: 0
CHEST TIGHTNESS: 0
PSYCHIATRIC NEGATIVE: 1
ABDOMINAL PAIN: 0

## 2025-01-13 NOTE — PROGRESS NOTES
Chief Complaint:   Please See Below     History Of Present Illness:    Mauri Sanchez is a 69 y.o. male presenting with CAD.    The patient's cardiac history dates back to 2003 when he sustained a myocardial infarction and cardiac arrest.  He went on to have CABG x 3 (LIMA-LAD; SVG-PDA; SVG-OM).  He also is status post prior stent with BMS of the OM in the remote past.  He previously followed with Dr. Fernandez, but has not seen him in several years.  His most recent SPECT available in the electronic medical record is from July 2017 at which time he was found to have a large fixed anterolateral infarct with an ejection fraction of 46%.  Of note he has a 4.4 cm abdominal aortic aneurysm by MRA imaging in July 2024.   The patient's stress test on 10/10/2024 disclosed moderate PVCs, no ischemia at 7 METS.  The patient's echocardiogram dated October 9, 2024 revealed an EF of 51%, with mild inferolateral hypokinesis, without significant valvular or pericardial disease.  The patient's monitor study done 10/9-19/2024 disclosed 10 runs of SVT, the longest being 11 beats in duration, and 8 episodes of nonsustained VT, the longest being 19 beats in duration.  His TOM studies were unremarkable.  His lower extremity venous insufficiency reflux studies were negative for reflux, but incidental note was made on adenopathy.  Please see the report for complete details.    The patient denies chest discomfort, dyspnea, palpitations, orthopnea, PND, syncope, and near syncope.    He will be seeing Dr. Moreno in the next 6 weeks or so.       Last Recorded Vitals:  Vitals:    01/13/25 0834   BP: 148/72   BP Location: Left arm   Patient Position: Sitting   Pulse: 56   SpO2: 98%   Weight: 94 kg (207 lb 3.2 oz)   Height: 1.829 m (6')       Past Medical History:  He has a past medical history of Basal cell carcinoma, GERD (gastroesophageal reflux disease), Hypercholesteremia, Hypertension, Myocardial infarction (Multi), Prostate cancer (Multi),  and Squamous cell carcinoma of hand, left.    Past Surgical History:  He has a past surgical history that includes Skin cancer excision; Cardiac surgery; and Hernia repair.      Social History:  He reports that he has been smoking cigarettes. He has never been exposed to tobacco smoke. He has never used smokeless tobacco. He reports current alcohol use of about 5.0 standard drinks of alcohol per week. He reports that he does not use drugs.    Family History:  Family History   Problem Relation Name Age of Onset    Skin cancer Mother      Other (cardiac stent) Mother      Skin cancer Father      Other (cardiac bypass surgery) Father      Other (cardiac bypass surgery) Sister      Other (cardiac stent) Brother          Allergies:  Codeine and Sulfa (sulfonamide antibiotics)    Outpatient Medications:  Current Outpatient Medications   Medication Instructions    aspirin 81 mg, Every other day    atorvastatin (LIPITOR) 80 mg, oral, Daily    bisoprolol (Zebeta) 5 mg tablet TAKE ONE TABLET BY MOUTH EVERY DAY FOR 90 DAYS    chlordiazepoxide-clidinium (Librax) 5-2.5 mg capsule TAKE ONE CAPSULE BY MOUTH DAILY FOR 90 DAYS    cholecalciferol (VITAMIN D3) 2,000 Units, Daily    doxycycline (Vibra-Tabs) 100 mg tablet TAKE ONE TABLET BY MOUTH TWO TIMES A DAY FOR 10 DAYS WITH FOOD    lisinopril 10 mg, Daily    omeprazole OTC (PRILOSEC OTC) 20 mg, Daily before breakfast    tadalafil (CIALIS) 5 mg, oral, Daily    ZINC ORAL 1 capsule, Daily       Physical Exam:  GENERAL:  pleasant 69 year-old  HEENT: No xanthelasma  NECK: Supple, no palpable adenopathy or thyromegaly  CHEST: Clear to auscultation, respiratory effort unlabored  CARDIAC: RRR, normal S1 and S2, no audible murmur, rub, gallop, carotids are brisk, PMI is not displaced  ABD: Active bowel sounds, nontender, no organomegaly, no evidence of ascites  EXT: No clubbing, cyanosis,  or tenderness; there is trace lower extremity edema.  NEURO: Awake, alert, appropriate, speech is  "fluent       Last Labs:  CBC -  No results found for: \"WBC\", \"HGB\", \"HCT\", \"MCV\", \"PLT\"    CMP -  No results found for: \"CALCIUM\", \"PHOS\", \"PROT\", \"ALBUMIN\", \"AST\", \"ALT\", \"ALKPHOS\", \"BILITOT\"    LIPID PANEL -   Lab Results   Component Value Date    CHOL 204 (H) 10/10/2024    TRIG 267 (H) 10/10/2024    HDL 33.9 10/10/2024    CHHDL 6.0 10/10/2024    VLDL 53 (H) 10/10/2024    NHDL 170 (H) 10/10/2024       RENAL FUNCTION PANEL -   No results found for: \"GLUCOSE\", \"NA\", \"K\", \"CL\", \"CO2\", \"ANIONGAP\", \"BUN\", \"CREATININE\", \"GFRMALE\", \"CALCIUM\", \"PHOS\", \"ALBUMIN\"     No results found for: \"BNP\", \"HGBA1C\"      Diagnostic review: I have reviewed the lower extremity venous insufficiency reflux study.  Please see the report for complete details.    Assessment/Plan   Assessment & Plan  Coronary artery disease involving native coronary artery of native heart without angina pectoris  Continue medical therapy  Orders:    Follow Up In Cardiology; Future    Nonsustained ventricular tachycardia (Multi)  Follow-up with electrophysiology as scheduled  Orders:    Follow Up In Cardiology; Future    Old MI (myocardial infarction)    Orders:    Follow Up In Cardiology; Future    S/P CABG (coronary artery bypass graft)    Orders:    Follow Up In Cardiology; Future    Lymphadenopathy  I advised the patient to contact his primary care physician regarding the groin adenopathy noted incidentally on his venous insufficiency reflux study.  Will defer to his primary care physician regarding further evaluation as necessary.             Marcelino Burnett MD  "

## 2025-01-13 NOTE — PROGRESS NOTES
PRIOR NOTES  69-year-old male with prostate cancer referred to me for evaluation for ADT  PMH: Hyperlipidemia, prior MI, lower urinary tract symptoms, hypertension     Lower urinary tract symptoms on Flomax, tadalafil 5 mg daily.  Patient recently started, has improving symptoms.     Prostate cancer history:  - Intermediate risk unfavorable (50% cores, PSA 15.8, grade group 2)  - cT1c, grade group 2, 7/14 cores, PSA 15.8  [place] 15  IPSS 33  - 4/1/24 [facility] PET ROSANGELA outside of prostate  -7/9/2024-[facility] and fiducials  7/12/2024-leuprolide acetate 45 mg (6 months) first and only dose  -8/13/24 - SBRT completed    UPDATED SUBJECTIVE HISTORY  01/13/25 - Pt having severe LUTS, had severe LUTS at night with painful urination. Severe urge w/ urge incontinence on the way to the bathroom.   IPSS 26 - CBD gummy helps his nocturia  NAVARRO 0 - on cialis    Past Medical History  He has a past medical history of Basal cell carcinoma, GERD (gastroesophageal reflux disease), Hypercholesteremia, Hypertension, Myocardial infarction (Multi), Prostate cancer (Multi), and Squamous cell carcinoma of hand, left.    Surgical History  He has a past surgical history that includes Skin cancer excision; Cardiac surgery; and Hernia repair.     Social History  He reports that he has been smoking cigarettes. He has never been exposed to tobacco smoke. He has never used smokeless tobacco. He reports current alcohol use of about 5.0 standard drinks of alcohol per week. He reports that he does not use drugs.    Family History  Family History   Problem Relation Name Age of Onset    Skin cancer Mother      Other (cardiac stent) Mother      Skin cancer Father      Other (cardiac bypass surgery) Father      Other (cardiac bypass surgery) Sister      Other (cardiac stent) Brother          Allergies  Codeine and Sulfa (sulfonamide antibiotics)    ROS: 12 system review was completed and is negative with the exception of those signs and symptoms noted in the  "history of present illness: A 12 system review was completed and is negative with the exception of those signs and symptoms noted in the history of present illness.     Exam:  General: in NAD, appears stated age  Head: normocephalic, atraumatic  Respiratory: normal effort, no use of accessory muscles  Cardiovascular: no edema noted  Skin: normal turgor, no rashes  Neurologic: grossly intact, oriented to person/place/time  Psychiatric: mode and affect appropriate     Last Recorded Vitals  Blood pressure 138/78, pulse 54, height 1.829 m (6'), weight 94.5 kg (208 lb 6.4 oz).    No results found for: \"PSASCREEN\", \"PSAR\", \"KSCOR\", \"CREATININE\", \"HGB\"      ASSESSMENT/PLAN:  # Lower urinary tract symptoms, urgency, frequency  -Continue tamsulosin  -Restart tadalafil 5 mg daily, patient was under the impression this was for erectile dysfunction only.  I informed him that it could help with his urinary urgency and frequency  -I discussed starting an anticholinergic, we will reassess after he starts his tadalafil    # Erectile dysfunction  -Again, we will see how he does after his testosterone recovers    # Prostate cancer  -S/p SBRT  -Due for testosterone, PSA    FUV 4 mo    Shade Corrales MD    "

## 2025-01-13 NOTE — PROGRESS NOTES
Cancer synopsis:  Rad/onc: Dr. Gardner/ Karen PELAEZ    69 y.o. male with unfavorable-intermediate risk prostate cancer, [id number], Fairmont score 3+4=7, 7/14 positive cores, pPSA 15, Grade group 2     Was under active surveillance for low risk prostate cancer by urology ([facility] Dr Beckett). Pt reportedly underwent a biopsy  which demonstrated Fairmont score 3+4=7, 7/14 positive cores  MRI of the prostate showed BPH, No focal lesions with imaging features suspicious for clinically significant prostate cancer (PI-RADS 1)  ZOFIA. PSMA PET/CT pending    7/12/2024-leuprolide acetate 45 mg (6 months) first and only dose     SBRT to prostate 08/13/2024    PMH:  MI, CABG x3, smoker, BPH    History of presenting illness:    Patient ID: [id number]     Mauri Sanchez is a 69 y.o. male who presents for fo rhis UIR prostate cancer [id number], Raz score 3+4=7, 7/14 positive cores, pPSA 15, Grade group 2 now s/p RT    RT Site: prostate  RT Date: 08/13/2024  Hormone therapy: Yes,   Hot Flushes: Denies  Fatigue: Denies  Bone pain: Denies  ED: Reports to loss of sexual function since lupron injection.  - Quality of erections during the last 4 weeks: 1 = None at all  - Use of erectile dysfunction medications:  None  IPSS: virtual  Urinary symptoms: Admits to nocturia about 4-5 times a night. Was an issue prior to RT. Reports some mild urgency that is new. Denies leakage. Denies weak stream or incomplete bladder emptying.  Urinary Medications: Yes, cialis 5mg  Rectal bleeding: Denies  Colonoscopy: cologaurd positive 02/04/2022  Other systems: Denies    Review of systems:  Review of Systems   Constitutional:  Negative for fatigue, fever and unexpected weight change.   Respiratory:  Negative for cough, chest tightness and shortness of breath.    Cardiovascular:  Negative for chest pain, palpitations and leg swelling.   Gastrointestinal:  Negative for abdominal pain, anal bleeding, blood in stool, constipation, diarrhea and rectal pain.    Endocrine: Negative for cold intolerance, heat intolerance and polyuria.   Genitourinary:  Negative for decreased urine volume, difficulty urinating, dysuria, frequency, hematuria and urgency.   Musculoskeletal:  Negative for arthralgias, back pain, gait problem and joint swelling.   Skin: Negative.    Allergic/Immunologic: Negative.    Neurological:  Negative for dizziness, syncope and weakness.   Psychiatric/Behavioral: Negative.       PERFORMANCE STATUS:  KPS/ECO, Fully active, able to carry on all pre-disease performed without restriction (ECOG equivalent 0)    Past Medical history  Past Medical History:   Diagnosis Date    Basal cell carcinoma     GERD (gastroesophageal reflux disease)     Hypercholesteremia     Hypertension     Myocardial infarction (Multi)     Prostate cancer (Multi)     Squamous cell carcinoma of hand, left       Surgical/family history  Family History   Problem Relation Name Age of Onset    Skin cancer Mother      Other (cardiac stent) Mother      Skin cancer Father      Other (cardiac bypass surgery) Father      Other (cardiac bypass surgery) Sister      Other (cardiac stent) Brother        Past Surgical History:   Procedure Laterality Date    CARDIAC SURGERY      HERNIA REPAIR      SKIN CANCER EXCISION        Social History  Tobacco Use: High Risk (2025)    Patient History     Smoking Tobacco Use: Every Day     Smokeless Tobacco Use: Never     Passive Exposure: Never       Current med list:  Current Outpatient Medications   Medication Instructions    aspirin 81 mg, Every other day    atorvastatin (LIPITOR) 80 mg, oral, Daily    bisoprolol (Zebeta) 5 mg tablet TAKE ONE TABLET BY MOUTH EVERY DAY FOR 90 DAYS    chlordiazepoxide-clidinium (Librax) 5-2.5 mg capsule TAKE ONE CAPSULE BY MOUTH DAILY FOR 90 DAYS    cholecalciferol (VITAMIN D3) 2,000 Units, Daily    doxycycline (Vibra-Tabs) 100 mg tablet TAKE ONE TABLET BY MOUTH TWO TIMES A DAY FOR 10 DAYS WITH FOOD    lisinopril 10  mg, Daily    omeprazole OTC (PRILOSEC OTC) 20 mg, Daily before breakfast    tadalafil (CIALIS) 5 mg, oral, Daily    ZINC ORAL 1 capsule, Daily        Last recorded vital:  virtual    Physical exam  virtual    Pertinent labs:  PSA   Date/Time Value Ref Range Status   03/28/2024 09:16 AM 15.8 (H) 0.0 - 4.0 ng/mL Final     Comment:     INTERPRETIVE INFORMATION: Prostate Specific Antigen    The Roche PSA electrochemiluminescent immunoassay is used. Results   obtained with different test methods or kits cannot be used   interchangeably. The Roche PSA method is approved for use as an   aid in the detection of prostate cancer when used in conjunction   with a digital rectal exam in individuals with a prostate age 50   years and older. The Roche PSA is also indicated for the serial   measurement of PSA to aid in the prognosis and management of   prostate cancer patients. Elevated PSA concentrations can only   suggest the presence of prostate cancer until biopsy is performed.   PSA concentrations can also be elevated in benign prostatic   hyperplasia or inflammatory conditions of the prostate. PSA is   generally not elevated in healthy individuals or individuals with   nonprostatic carcinoma.     Dx:  Problem List Items Addressed This Visit    None  Visit Diagnoses       Malignant neoplasm of prostate (Multi)    -  Primary        Due for labs today will call with results. Review of latent SE including rectal bleeding, hematuria, urinary strictures, ED where reviewed as well as how to contact office if s/s present. Denies latent SE. Discussed ongoing ED likely r/t to recovering testosterone and will re-assess 4m at next visit. NCCN guidelines where reviewed and routine FUV of every 3m for first year and every 6m for four years for a total of five years was discussed. Patient verbalized understanding.      Recommend vitamin D supplementation, start (or increase by) 400-1000 units daily. Reviewed importance of intake while on  Testerone lowering therapy as well as after until Testerone re-establishes, at which point may stop if DEXA indicative of normal bone density. Also reviewed that individuals at a higher risk such as those over the age of 75 or those with a hx of bone fx, osteoporosis or osteopenia to continue supplementation indefinitely with routine DEXA imaging as per national guidelines to assess bone health continually.    Discussed guidelines for 150 minutes of moderate exercise a week or 75 minutes of Vigorous exercise to help control and reduce weight gain.    Discussed positive cologaurd in the past and repeat testing as this was 2yrs ago.    PLAN:  FUV 4m  Labs Psa and testosterone today and in 4m  Imaging none  Screening: Repeat cologaurd testing r/t positive cologaurd 2yrs ago.  FUV other providers: PCP for routine evals    Please contact office with any concerns:  [facility]  [phone]    I performed this visit using realtime telehealth tools, including an audio/video OR telephone connection between patient’s name and location Mauri Sanchez and Dayday Jones [facility].    2. POS 10: Telehealth provided in patient's home.  o Patient is located in their home (which is a location other than a hospital or other  facility where the patient receives care in a private residence) when receiving  health services or health related services through telecommunication technology.

## 2025-01-13 NOTE — PATIENT INSTRUCTIONS
"You should increase your intake of fresh fruits and vegetables.  Try to consume 9-12 servings per day of such foods.  You should increase your intake of deep sea fish such as salmon and tuna.  Try to get two servings per week of fish, but if you are a pregnant woman, talk to your obstetrician before increasing your fish intake.  You should increase your intake of unprocessed nuts such as walnuts or almonds.  Increase your intake of plant-based protein.  You should avoid fried foods.  Don't consume sugary or starchy foods and sugary drinks.  Avoid saturated fats.  Try not to dine at restaurants more than once per month, and don't dine at fast food places.  Try to get 7-9 hours of sleep every night.  Try to get 150 minutes per week of moderate intensity exercise (after I have cleared you to start an exercise program).  Try to maintain the appropriate weight for your height based on body mass index (BMI). Maintain your cholesterol, blood sugar, and blood pressure in the recommended respective normal ranges.  There is a wealth of information on the American Heart Association's website regarding this.  Just Google \"Life's Essential 8\" for more information.   Ask me about any of these details  if you have questions.    As your cardiologist, I will be available to you at any time to answer any question you have concerning your heart health.  My staff, Teresa can also answer any questions you may have.  Best of luck.You need to stop smoking. As you know, smoking increases the risk of future heart attacks, strokes, cancer, and emphysema. In addition to these problems, someone who smokes a pack a day spends $2000 per year on tobacco alone. Those costs add up, so that someone who has smoked a pack a day for twenty years has spent $40,000 out of pocket on tobacco in their lifetime. To help you quit smoking, you should call the [place] Quit Line at 2-438-QUIT-NOW. They will have other tips to help you quit. Also, there " are good medicines that can help you quit.  [facility] has a tobacco clinic that can guide you through the process.  Just let me know if you'd like a referral.It is important for us to have an accurate list of the medications, supplements, and their doses.  It is also important for us to have an accurate list of your allergies.  Please bring this information to every appointment.  This is a vital part of the quality of care you receive through all of your providers.

## 2025-02-20 PROBLEM — Z72.0 TOBACCO ABUSE: Status: ACTIVE | Noted: 2024-10-03

## 2025-02-20 PROBLEM — C44.722 SQUAMOUS CELL CARCINOMA OF SKIN OF RIGHT LOWER LIMB, INCLUDING HIP: Status: ACTIVE | Noted: 2021-01-13

## 2025-02-20 PROBLEM — C44.622 SQUAMOUS CELL CARCINOMA OF SKIN OF RIGHT UPPER LIMB, INCLUDING SHOULDER: Status: ACTIVE | Noted: 2021-01-13

## 2025-03-13 ENCOUNTER — APPOINTMENT (OUTPATIENT)
Dept: CARDIOLOGY | Facility: CLINIC | Age: 70
End: 2025-03-13
Payer: COMMERCIAL

## 2025-04-08 ENCOUNTER — OFFICE VISIT (OUTPATIENT)
Dept: ORTHOPEDIC SURGERY | Facility: CLINIC | Age: 70
End: 2025-04-08
Payer: COMMERCIAL

## 2025-04-08 ENCOUNTER — HOSPITAL ENCOUNTER (OUTPATIENT)
Dept: RADIOLOGY | Facility: CLINIC | Age: 70
Discharge: HOME | End: 2025-04-08
Payer: COMMERCIAL

## 2025-04-08 DIAGNOSIS — M54.16 LUMBAR RADICULOPATHY: Primary | ICD-10-CM

## 2025-04-08 DIAGNOSIS — M25.552 LEFT HIP PAIN: ICD-10-CM

## 2025-04-08 PROCEDURE — 1160F RVW MEDS BY RX/DR IN RCRD: CPT | Performed by: ORTHOPAEDIC SURGERY

## 2025-04-08 PROCEDURE — 99213 OFFICE O/P EST LOW 20 MIN: CPT | Performed by: ORTHOPAEDIC SURGERY

## 2025-04-08 PROCEDURE — 1159F MED LIST DOCD IN RCRD: CPT | Performed by: ORTHOPAEDIC SURGERY

## 2025-04-08 PROCEDURE — 73502 X-RAY EXAM HIP UNI 2-3 VIEWS: CPT | Mod: LT

## 2025-04-08 PROCEDURE — 99204 OFFICE O/P NEW MOD 45 MIN: CPT | Performed by: ORTHOPAEDIC SURGERY

## 2025-04-09 NOTE — PROGRESS NOTES
New patient me 69-year-old gentleman presents with with a chief complaint of left-sided hip pain.  However after initial discussion he states that his big issue is his entire left leg.  He states he has episodes where he is walking when his left leg goes completely numb and gives out on him and causes him to fall.  Denies any specific episode of injury has been ongoing for the past 6 to 8 months.  He did have a diagnosis of prostate cancer and is undergoing radiation treatment for that over the last 6 to 8 months so he had to hold off on any intervention but the numbness tingling and giving way on his left leg has persisted.  He does give a history of a L5-S1 issue in the past but he is not sure what it was and he did not have previous back surgery.  He denies any groin pain at this time.    Location of pain: All the way up and down the left leg  Quality of pain: Remittent pain sometimes is okay sometimes it gives out without any warning  Modifying factors: When he stands for prolonged period time or gets up and down from seated position  Associated signs and symptoms: Numbness and tingling and giving way in his entire left leg  Previous treatment: Recent treatment for prostate cancer so has had to hold off on any intervention        The patient's past medical history, family history, social history, and review of systems were documented on the patient's medical intake form.  The medical intake form was reviewed and scanned into the electronic medical record for future use.  History is otherwise negative except as stated in the HPI.    Physical exam    General: Alert and oriented to place, person, and time.  No acute distress and breathing comfortably; pleasant and cooperative with the examination.  HEENT: Head is normocephalic and atraumatic.  Neck: Supple, no visible swelling.  Cardiovascular: Good perfusion to the affected extremity.  Lungs: No audible wheezing or labored breathing.  Abdomen:  Nondistended  HEME/Lymph : No visible abnormalities bilateral lower extremity    Extremity:  Examination of his left hip shows no pain with internal extra rotation flexion abduction external rotation maneuver is negative is got some tenderness along the SI joint posteriorly in the lumbosacral junction.  Straight leg raise testing is plus minus.  Got good muscle strength distally brisk cap refill compartments are soft calf is nontender.    Diagnostics:      Imaging  No results found.    Cardiology, Vascular, and Other Imaging  XR hip left with pelvis when performed 2 or 3 views    Result Date: 4/8/2025  Interpreted By:  Ajit Wall, STUDY: XR HIP LEFT WITH PELVIS WHEN PERFORMED 2 OR 3 VIEWS; 4/8/2025 9:33 am   INDICATION: Signs/Symptoms:pain.   ACCESSION NUMBER(S): [id number]   ORDERING CLINICIAN: AJIT WALL   FINDINGS: AP pelvis and lateral view of the left hip. Minimal joint space narrowing small amount of cam type femoroacetabular impingement no evidence of fracture dislocation or other bony abnormalities     Signed by: Ajit Wall 4/8/2025 10:03 AM Dictation workstation:   [id number]          Procedures  [none   ]    Assessment:  Left-sided lumbar radiculitis    Treatment plan:  1.  The natural history of the condition and its associated treatment alternatives including surgical and nonsurgical options were discussed with the patient at length.  2.  As a hip and knee surgeon I reassured him that his hip joint looks okay I think his issues are more likely neurogenic in nature and likely coming from his back.  His hip joint does not show severe hip arthritis he is got good range of motion he does not have groin pain and is having shooting pain up and down his entire left leg.  I have recommended an MRI of his lumbar spine and follow-up with the back specialist.  3. [   ]  4.  All of the patient's questions were answered.    Orders Placed This Encounter    XR hip left with pelvis when  performed 2 or 3 views    MR lumbar spine wo IV contrast       This note was prepared using voice recognition software.  The details of this note are correct and have been reviewed, and corrected to the best of my ability.  Some grammatical areas may persist related to the Dragon software    Satish Wall MD  Senior Attending Physician  [facility]  Orthopedic [place]    [phone]

## 2025-04-25 ENCOUNTER — LAB (OUTPATIENT)
Dept: LAB | Facility: HOSPITAL | Age: 70
End: 2025-04-25
Payer: COMMERCIAL

## 2025-04-25 ENCOUNTER — HOSPITAL ENCOUNTER (OUTPATIENT)
Dept: RADIOLOGY | Facility: HOSPITAL | Age: 70
Discharge: HOME | End: 2025-04-25
Payer: COMMERCIAL

## 2025-04-25 DIAGNOSIS — M54.16 LUMBAR RADICULOPATHY: ICD-10-CM

## 2025-04-25 DIAGNOSIS — C61 MALIGNANT NEOPLASM OF PROSTATE (MULTI): Primary | ICD-10-CM

## 2025-04-25 LAB
HOLD SPECIMEN: NORMAL
PSA SERPL-MCNC: <0.1 NG/ML
TESTOST SERPL-MCNC: 199 NG/DL (ref 240–1000)

## 2025-04-25 PROCEDURE — 84153 ASSAY OF PSA TOTAL: CPT

## 2025-04-25 PROCEDURE — 84403 ASSAY OF TOTAL TESTOSTERONE: CPT

## 2025-04-25 PROCEDURE — 36415 COLL VENOUS BLD VENIPUNCTURE: CPT

## 2025-04-25 PROCEDURE — 72148 MRI LUMBAR SPINE W/O DYE: CPT

## 2025-05-05 ENCOUNTER — APPOINTMENT (OUTPATIENT)
Dept: UROLOGY | Facility: CLINIC | Age: 70
End: 2025-05-05
Payer: COMMERCIAL

## 2025-05-05 VITALS — SYSTOLIC BLOOD PRESSURE: 132 MMHG | DIASTOLIC BLOOD PRESSURE: 78 MMHG | TEMPERATURE: 97.3 F | HEART RATE: 57 BPM

## 2025-05-05 DIAGNOSIS — C61 PROSTATE CANCER (MULTI): ICD-10-CM

## 2025-05-05 DIAGNOSIS — N32.81 OVERACTIVE BLADDER: ICD-10-CM

## 2025-05-05 DIAGNOSIS — N13.8 BPH WITH OBSTRUCTION/LOWER URINARY TRACT SYMPTOMS: Primary | ICD-10-CM

## 2025-05-05 DIAGNOSIS — N40.1 BPH WITH OBSTRUCTION/LOWER URINARY TRACT SYMPTOMS: Primary | ICD-10-CM

## 2025-05-05 PROCEDURE — 1159F MED LIST DOCD IN RCRD: CPT | Performed by: UROLOGY

## 2025-05-05 PROCEDURE — 99215 OFFICE O/P EST HI 40 MIN: CPT | Performed by: UROLOGY

## 2025-05-05 PROCEDURE — G2211 COMPLEX E/M VISIT ADD ON: HCPCS | Performed by: UROLOGY

## 2025-05-05 PROCEDURE — 1160F RVW MEDS BY RX/DR IN RCRD: CPT | Performed by: UROLOGY

## 2025-05-05 RX ORDER — TROSPIUM CHLORIDE 20 MG/1
20 TABLET, FILM COATED ORAL 2 TIMES DAILY
Qty: 180 TABLET | Refills: 3 | Status: SHIPPED | OUTPATIENT
Start: 2025-05-05 | End: 2026-05-05

## 2025-05-05 ASSESSMENT — ENCOUNTER SYMPTOMS
DEPRESSION: 0
LOSS OF SENSATION IN FEET: 0
OCCASIONAL FEELINGS OF UNSTEADINESS: 0

## 2025-05-05 ASSESSMENT — PATIENT HEALTH QUESTIONNAIRE - PHQ9
1. LITTLE INTEREST OR PLEASURE IN DOING THINGS: NOT AT ALL
2. FEELING DOWN, DEPRESSED OR HOPELESS: NOT AT ALL
SUM OF ALL RESPONSES TO PHQ9 QUESTIONS 1 AND 2: 0

## 2025-05-05 NOTE — PROGRESS NOTES
PAST UROLOGICAL HISTORY:  69-year-old man with intermediate risk unfavorable prostate cancer (50% cores, PSA 15.8, grade group 2, cT1c). PSMA PET on 04/01/2024 showed ROSANGELA outside of prostate. He received SpaceOAR and fiducials on 07/09/2024, followed by leuprolide acetate 45 mg (6 months) on 07/12/2024, and completed SBRT in August 2024. He has a history of recurrent prostatitis and lower urinary tract symptoms.    I have independently reviewed and interpreted the patient's laboratory results, imaging studies, and prior clinical documentation as noted herein.    HPI TODAY 05/05/2025:    Prostate Cancer:   - Completed 6 months of androgen deprivation therapy as of July 2024, followed by SBRT completed in August 2024.  - PSA was undetectable on 04/25/2025.  - Testosterone level was 199 on 04/25/2025.    Lower Urinary Tract Symptoms:  - Severe worsening of symptoms with IPSS score of 35 today (increased from 26 in January).  - Experiencing nocturia, frequency, urgency, and urge incontinence.  - Reports painful urination.  - While in [place] two weeks ago, took empiric amoxicillin 250 mg PO BID with significant symptom improvement, but symptoms have returned.  - No gross hematuria reported.  - Currently on tamsulosin and tadalafil 5 mg daily started in January 2025.  - Previously reported CBD gummy helps with nocturia.    PSA History:  - 15.8 (at diagnosis)  - Undetectable (04/25/2025)    Testosterone History:  - 199 (04/25/2025)  - Currently status post 6-month leuprolide acetate 45 mg dose administered 07/12/2024    PAST MEDICAL HISTORY:  - Hyperlipidemia  - Prior myocardial infarction  - Hypertension    SOCIAL:  - Has a house in [place]    REVIEW OF SYSTEMS: A tailored review of systems was performed and all pertinent positives and negatives are listed in the HPI.    PHYSICAL EXAMINATION:  Gen: NAD  Pulm: No increased WOB on RA  Cards: WWP    ASSESSMENT/PLAN:    Prostate Cancer (C61)  - Assessment: Intermediate risk  unfavorable prostate cancer status post 6 months of ADT and SBRT.  - Plan:    - Continue monitoring PSA and testosterone levels    - Follow-up in 3 months    Lower Urinary Tract Symptoms/Recurrent Prostatitis (N41.1)  - Assessment: Severe LUTS with IPSS 35, likely related to radiation cystitis and/or recurrent prostatitis.  - Plan:    - Urinalysis, post-void residual, and urinary PCR for culture today    - Empiric antibiotics for one month based on culture results (culture-directed versus doxycycline)    - Consider fluoroquinolone if appropriate based on culture    - Start trospium 20 mg PO BID for anticholinergic effect    - Continue tamsulosin and tadalafil 5 mg daily    - Prophylactic Miralax or Benefiber recommended  - Counseling: Discussed potential side effects of anticholinergics including constipation, dry mouth, dry eyes, and management strategies.    Erectile Dysfunction (N52.9)  - Assessment: ED with NAVARRO score of 0, likely related to ADT and radiation therapy.  - Plan:    - Continue tadalafil 5 mg daily

## 2025-05-14 ENCOUNTER — TELEPHONE (OUTPATIENT)
Dept: RADIATION ONCOLOGY | Facility: HOSPITAL | Age: 70
End: 2025-05-14
Payer: COMMERCIAL

## 2025-05-14 DIAGNOSIS — N41.0 ACUTE PROSTATITIS: Primary | ICD-10-CM

## 2025-05-14 RX ORDER — DOXYCYCLINE HYCLATE 100 MG
100 TABLET ORAL 2 TIMES DAILY
Qty: 90 TABLET | Refills: 0 | Status: SHIPPED | OUTPATIENT
Start: 2025-05-14 | End: 2025-06-28

## 2025-05-14 NOTE — TELEPHONE ENCOUNTER
Called pt. to remind of appointment on 5/15/2025 at  9:30 am with CIERRA Jones. Pt's phone went to voicemail left number if needs to reschedule.

## 2025-05-22 ENCOUNTER — APPOINTMENT (OUTPATIENT)
Dept: UROLOGY | Facility: CLINIC | Age: 70
End: 2025-05-22
Payer: COMMERCIAL

## 2025-05-23 ENCOUNTER — HOSPITAL ENCOUNTER (OUTPATIENT)
Dept: RADIOLOGY | Facility: CLINIC | Age: 70
Discharge: HOME | End: 2025-05-23
Payer: COMMERCIAL

## 2025-05-23 ENCOUNTER — OFFICE VISIT (OUTPATIENT)
Dept: ORTHOPEDIC SURGERY | Facility: CLINIC | Age: 70
End: 2025-05-23
Payer: COMMERCIAL

## 2025-05-23 DIAGNOSIS — M54.50 LUMBAR PAIN: ICD-10-CM

## 2025-05-23 DIAGNOSIS — R20.0 LEG NUMBNESS: ICD-10-CM

## 2025-05-23 PROCEDURE — 72120 X-RAY BEND ONLY L-S SPINE: CPT

## 2025-05-23 PROCEDURE — 99212 OFFICE O/P EST SF 10 MIN: CPT | Performed by: ORTHOPAEDIC SURGERY

## 2025-05-23 NOTE — PROGRESS NOTES
New patient to establish to the practice comes the office with his wife who is also historian on patient's treatment symptoms or results.  He saw our hip doctor and he sent him to us because patient states his whole left leg would just go numb.  He has no pain down that leg.  Been going on about 8 months to year if he sits for long periods at times it will happen.  He denies bowel or bladder complaints saddle anesthesia.  Gait or balance changes denies any trauma accidents or falls to cause he denies any spine surgery.  Denies any treatment.  He is not a diabetic.    We looked at patient's recent blood work.  We checked a lipid panel cholesterol was 204 HDL was 33 triglycerides 267 looked at primary doctors note we checked the medication list see if he is on any type of statin medication sometimes can cause muscular aches and pains he is taking Lipitor he is also on aspirin as far as blood thinner.    Physical exam: Well-nourished, well kept.  No lymphangitis or lymphadenopathy in the examined extremities.  Good perfusion to the extremities ×4.  Radial and dorsalis pedis pulses 2+.  Capillary refill to all 4 digits brisk.  No distal edema x 4.  Patient ambulating with no major difficulty.  Good range of motion to the cervical spine no instability.  Full range of motion lumbar spine able to get down and touch his toes.  No instability.  Patient moving upper extremities by difficulty motor strength intact examination of the lower extremities reveals no point tenderness, swelling, or deformity.  Range of motion of the hips, knees, and ankles are full without crepitance, instability, or exacerbation of pain.  Strength is 5/5 throughout.  No redness, abrasions, or lesions on all 4 extremities, head and neck, or trunk.  Patient neurologically intact    X-ray: X-ray lumbar spine taken today and reviewed shows degenerative disc disease most severe at the L5-S1 level.  There is also some atherosclerotic disease noted.  There  is no fractures dislocations or bony lytic lesions there is a mild scoliosis measuring about 1213 degrees.  Hip joints are preserved on AP x-ray.  Patient also had an MRI that was done and that report was reviewed as well showing degenerative disc disease most severe at L5-S1 level.  There is some foraminal narrowing at the L4-5 level there is hemangioma also noted at the L5 vertebral body.  I do not see any severe stenosis or severe foraminal narrowing.    Assessment: This a patient with left leg going completely numb for about 8 to 10 months.  Affecting his daily and bodily function.      For complete plan and/or surgical details, please refer to Dr. Franco's portion of this split dictation.      Anupam Singh PA-C      In a face-to-face encounter, I performed a history and physical examination, discussed pertinent diagnostic studies if indicated, and discussed diagnosis and management strategies with both the patient and the midlevel provider.  I reviewed the midlevel's note and agree with the documented findings and plan of care.    Patient with numbness in his leg.  He has 2 or 3 days throughout a month where it is active and then he is fine all the other days.  When it goes numb it lasts a few minutes and then goes away.  He would say that 98% of the time his leg is perfectly fine and then the 2% it is numb and he has difficulty walking.  He has been in airports before where he has to stop and even ends up on the ground because of this.  MRI of his lumbar spine was reviewed does not show any real significant stenosis anywhere in the lumbar spine.  I do not think this is a back issue.  He says his entire leg goes numb which is not consistent with a radiculopathy.  He has no pain.  He only has numbness and weakness intermittently.  I am concerned that this may be more of a neurologic condition.  I am going to get him into neurology and see if they could explain this problem.  He understands that really the  only way for us to identify what the problem is from a spine perspective would be to do an operation on him.  The only area where he even has mild stenosis is L4-5 on the left.  I do not think epidurals would help at all because epidurals do not relieve numbness or weakness and those are his only 2 symptoms.  I do not think he is symptomatic enough and frequently enough to even consider doing an operation just to see if this is the cause of his issues.  He does have this new acute problem of uncertain prognosis.  We have reviewed Dr. Wall's notes who sent him here to see us.  We reviewed his MRI.  We have ordered and reviewed x-rays today.  We will see him back on a as needed basis.    Jeffery Franco MD  Orthopedic surgery

## 2025-06-24 ASSESSMENT — ENCOUNTER SYMPTOMS
WEAKNESS: 0
ALLERGIC/IMMUNOLOGIC NEGATIVE: 1
DIZZINESS: 0
HEMATURIA: 0
ABDOMINAL PAIN: 0
PALPITATIONS: 0
JOINT SWELLING: 0
ARTHRALGIAS: 0
FREQUENCY: 0
FEVER: 0
BLOOD IN STOOL: 0
SHORTNESS OF BREATH: 0
RECTAL PAIN: 0
FATIGUE: 0
CHEST TIGHTNESS: 0
ANAL BLEEDING: 0
DIARRHEA: 0
CONSTIPATION: 0
DYSURIA: 0
PSYCHIATRIC NEGATIVE: 1
BACK PAIN: 0
DIFFICULTY URINATING: 0
COUGH: 0
UNEXPECTED WEIGHT CHANGE: 0

## 2025-06-24 NOTE — PROGRESS NOTES
Cancer synopsis:  Rad/onc: Dr. Gardner/ Karen PELAEZ    69 y.o. male with unfavorable-intermediate risk prostate cancer, [id number], [place] score 3+4=7, 7/14 positive cores, pPSA 15, Grade group 2     Was under active surveillance for low risk prostate cancer by urology ([facility] Dr Beckett). Pt reportedly underwent a biopsy  which demonstrated [place] score 3+4=7, 7/14 positive cores  MRI of the prostate showed BPH, No focal lesions with imaging features suspicious for clinically significant prostate cancer (PI-RADS 1)  ZOFIA. PSMA PET/CT pending    7/12/2024-leuprolide acetate 45 mg (6 months) first and only dose     SBRT to prostate 08/13/2024    PMH:  MI, CABG x3, smoker, BPH    History of presenting illness:    Patient ID: [id number]     Mauri Sanchez is a 69 y.o. male who presents for his UIR prostate cancer [id number], [place] score 3+4=7, 7/14 positive cores, pPSA 15, Grade group 2 now s/p RT and st term adt    RT Site: prostate  RT Date: 08/13/2024  Hormone therapy: Yes, completed, t still recoverying  Hot Flushes: Denies  Fatigue: reports mild fatigue however feels has been improving albiet slowly per patient  Bone pain: Denies  ED: Reports to loss of sexual function since lupron injection.  - Quality of erections during the last 4 weeks: 1 = None at all  - Use of erectile dysfunction medications:  None  IPSS: assigned to pt to complete  Urinary symptoms: Admits to nocturia about 4-5 times a night. Was an issue prior to RT. Reports some mild urgency that is new however states feels is improving now. Denies leakage. Denies weak stream or incomplete bladder emptying.  Urinary Medications: Yes, cialis 5mg  Rectal bleeding: Denies  Colonoscopy: cologaurd positive 02/04/2022  Other systems: Denies SOB, CP or fever. Reports since starting and completing ADT having gained about 20lbs. Asking about ozempic    Review of systems:  Review of Systems   Constitutional:  Negative for fatigue, fever and unexpected weight change.    Respiratory:  Negative for cough, chest tightness and shortness of breath.    Cardiovascular:  Negative for chest pain, palpitations and leg swelling.   Gastrointestinal:  Negative for abdominal pain, anal bleeding, blood in stool, constipation, diarrhea and rectal pain.   Endocrine: Negative for cold intolerance, heat intolerance and polyuria.   Genitourinary:  Negative for decreased urine volume, difficulty urinating, dysuria, frequency, hematuria and urgency.   Musculoskeletal:  Negative for arthralgias, back pain, gait problem and joint swelling.   Skin: Negative.    Allergic/Immunologic: Negative.    Neurological:  Negative for dizziness, syncope and weakness.   Psychiatric/Behavioral: Negative.       PERFORMANCE STATUS:  KPS/ECO, Fully active, able to carry on all pre-disease performed without restriction (ECOG equivalent 0)    Past Medical history  Past Medical History:   Diagnosis Date    Basal cell carcinoma     GERD (gastroesophageal reflux disease)     Hypercholesteremia     Hypertension     Myocardial infarction (Multi)     Prostate cancer (Multi)     Squamous cell carcinoma of hand, left       Surgical/family history  Family History   Problem Relation Name Age of Onset    Skin cancer Mother      Other (cardiac stent) Mother      Skin cancer Father      Other (cardiac bypass surgery) Father      Other (cardiac bypass surgery) Sister      Other (cardiac stent) Brother        Past Surgical History:   Procedure Laterality Date    CARDIAC SURGERY      HERNIA REPAIR      SKIN CANCER EXCISION        Social History  Tobacco Use: High Risk (2025)    Patient History     Smoking Tobacco Use: Every Day     Smokeless Tobacco Use: Never     Passive Exposure: Never       Current med list:  Current Outpatient Medications   Medication Instructions    aspirin 81 mg, Every other day    atorvastatin (LIPITOR) 80 mg, oral, Daily    bisoprolol (Zebeta) 5 mg tablet TAKE ONE TABLET BY MOUTH EVERY DAY FOR 90 DAYS     chlordiazepoxide-clidinium (Librax) 5-2.5 mg capsule TAKE ONE CAPSULE BY MOUTH DAILY FOR 90 DAYS    cholecalciferol (VITAMIN D3) 2,000 Units, Daily    doxycycline (VIBRA-TABS) 100 mg, oral, 2 times daily, Take with a full glass of water and do not lie down for at least 30 minutes after.    lisinopril 10 mg, Daily    omeprazole OTC (PRILOSEC OTC) 20 mg, Daily before breakfast    tadalafil (CIALIS) 5 mg, oral, Daily    trospium (SANCTURA) 20 mg, oral, 2 times daily    ZINC ORAL 1 capsule, Daily      Last recorded vital:  virtual    Physical exam  virtual    Pertinent labs:  Prostate Specific AG   Date/Time Value Ref Range Status   04/25/2025 07:41 AM <0.10 <=4.00 ng/mL Final     Dx:  Problem List Items Addressed This Visit    None  Visit Diagnoses         Malignant neoplasm of prostate (Multi)    -  Primary    Relevant Orders    Prostate Specific Antigen    Clinic Appointment Request Follow Up; [facility] (virtual); [facility] [address] [facility] (virtual)        PSA remains <0.10. Testosterone now 199 and rising nicely. Advised continued physical activity to help natural increase advised against exogenous forms of testosterone usage given prostate ca hx. Advised repeat labs today to assess testosterone return along w/ Psa stability. Review of latent SE including rectal bleeding, hematuria, urinary strictures, ED where reviewed as well as how to contact office if s/s present. Denies latent SE. Discussed ongoing ED likely r/t to recovering testosterone and will re-assess 4m at next visit. Given routine FUV w/ urology will plan for next eval in 6m if normal will move to q12m w/ urology eval in between.     Hormonal therapy:  Recommend vitamin D supplementation, start (or increase by) 400-1000 units daily. Reviewed importance of intake while on Testerone lowering therapy as well as after until Testerone re-establishes, at which point may stop if DEXA indicative of normal bone density.   Reviewed national guidelines for  calcium intake of 1000 mg a day whether by diet or supplementation. Reviewed importance of intake while on Testerone lowering therapy as well as after until Testerone re-establishes, at which point may stop if DEXA indicative of normal bone density. Also reviewed that individuals at a higher risk such as those over the age of 75 or those with a hx of bone fx, osteoporosis or osteopenia to continue supplementation indefinitely with routine DEXA imaging as per national guidelines to assess bone health continually. Discussed guidelines for 150 minutes of moderate exercise a week or 75 minutes of Vigorous exercise to help control and reduce weight gain.    PLAN:  FUV 6m (will see urology in 3m w/ repeat labs)  Labs Psa and testosterone today and in 4m  Imaging none  Screening: Repeat cologaurd testing r/t positive cologaurd 3yrs ago.  FUV other providers: PCP for routine evals, urology    Please contact office with any concerns:  Dayday Terrazas [facility]  [phone]    I performed this visit using realtime telehealth tools, including an audio/video OR telephone connection between patient’s name and location Mauri Sanchez and Dayday Jones [facility].    2. POS 10: Telehealth provided in patient's home.  o Patient is located in their home (which is a location other than a hospital or other  facility where the patient receives care in a private residence) when receiving  health services or health related services through telecommunication technology.

## 2025-06-25 ENCOUNTER — APPOINTMENT (OUTPATIENT)
Dept: RADIATION ONCOLOGY | Facility: HOSPITAL | Age: 70
End: 2025-06-25
Payer: COMMERCIAL

## 2025-06-25 DIAGNOSIS — C61 MALIGNANT NEOPLASM OF PROSTATE (MULTI): Primary | ICD-10-CM

## 2025-06-25 PROCEDURE — 99213 OFFICE O/P EST LOW 20 MIN: CPT | Mod: 95

## 2025-06-25 PROCEDURE — 99213 OFFICE O/P EST LOW 20 MIN: CPT

## 2025-07-29 ENCOUNTER — LAB (OUTPATIENT)
Dept: LAB | Facility: HOSPITAL | Age: 70
End: 2025-07-29
Payer: COMMERCIAL

## 2025-09-15 ENCOUNTER — APPOINTMENT (OUTPATIENT)
Dept: UROLOGY | Facility: CLINIC | Age: 70
End: 2025-09-15
Payer: COMMERCIAL

## 2026-01-19 ENCOUNTER — APPOINTMENT (OUTPATIENT)
Dept: CARDIOLOGY | Facility: CLINIC | Age: 71
End: 2026-01-19
Payer: COMMERCIAL

## (undated) DEVICE — APPLICATOR, PREP, CHLORAPREP, W/ORANGE TINT, 10.5ML

## (undated) DEVICE — TOWEL PACK, STERILE, 4/PACK, BLUE

## (undated) DEVICE — MARKER, SKIN, REGULAR TIP, W/W/FLEXI RULER, LABEL

## (undated) DEVICE — GLOVE, SURGICAL, PROTEXIS MICRO, 8.5, PF, LATEX

## (undated) DEVICE — SYSTEM, SPACEOAR VUE, HYDROGEL

## (undated) DEVICE — SPONGE, GAUZE, XRAY DECT, 16 PLY, 4 X 4, W/MASTER DMT,STERILE

## (undated) DEVICE — COVER, TABLE